# Patient Record
Sex: MALE | Race: WHITE | NOT HISPANIC OR LATINO | URBAN - METROPOLITAN AREA
[De-identification: names, ages, dates, MRNs, and addresses within clinical notes are randomized per-mention and may not be internally consistent; named-entity substitution may affect disease eponyms.]

---

## 2021-02-23 ENCOUNTER — HISTORICAL (OUTPATIENT)
Dept: ADMINISTRATIVE | Facility: HOSPITAL | Age: 71
End: 2021-02-23

## 2022-04-09 ENCOUNTER — HISTORICAL (OUTPATIENT)
Dept: ADMINISTRATIVE | Facility: HOSPITAL | Age: 72
End: 2022-04-09
Payer: MEDICARE

## 2022-04-29 VITALS
BODY MASS INDEX: 33.64 KG/M2 | SYSTOLIC BLOOD PRESSURE: 140 MMHG | DIASTOLIC BLOOD PRESSURE: 72 MMHG | DIASTOLIC BLOOD PRESSURE: 79 MMHG | SYSTOLIC BLOOD PRESSURE: 157 MMHG | HEIGHT: 70 IN | WEIGHT: 235 LBS | HEIGHT: 70 IN | WEIGHT: 225.38 LBS | BODY MASS INDEX: 32.27 KG/M2

## 2022-05-02 NOTE — HISTORICAL [facility]
This is a historical note converted from [employer]. Formatting and pictures may have been removed.  Please reference [employer] for original formatting and attached multimedia. Chief Complaint  RIGHT KNEE PAIN. ?NO ?INJURY. ?HIS LEG SWELLS UP FROM THE KNEE TO HIS FOOT.  History of Present Illness  70-year-old male presents today for evaluation of bilateral knee pain right worse than left. ?He has knee pain for a number of years. ?He tried anti-inflammatories for this.? No injections.? Notes pain with ambulation?to the medial aspect of his right knee as well as patellofemoral joint. ?Has pain with deep knee flexion as well.? He has pain on a daily basis?that limits his activities of daily living.? Pain is sharp in nature. ?Denies radiation proximally or distally.  Review of Systems  Denies fevers, chills, chest pain, shortness of breath. Comprehensive review of systems performed and otherwise negative except as noted in HPI.  Physical Exam  Vitals & Measurements  T:?97.2? ?F (Oral)? BP:?140/72?  HT:?178.00?cm? WT:?106.590?kg? BMI:?33.64?  General: No acute distress, alert and oriented, healthy appearing?  HEENT: Head is atraumatic, mucous membranes are moist?  Cardiovascular: Brisk capillary refill  Lungs: Breathing non-labored?  Skin: no rashes appreciated?  Neurologic: Sensation is grossly intact distally  ?  Right knee:  Patient with extension to 5. ?Flexion to 95. ?Limited further due to pain. ?Has crepitus patellofemoral joint medial joint line.? Overall varus alignment that is not fully correctable.  Assessment/Plan  1.?Primary osteoarthritis of right knee?M17.11  ?Patient end-stage arthritis the right knee. ?He has tried anti-inflammatories. ?We discussed further treatment options including injections as well as eventual arthroplasty.? The current time he like to continue with conservative measures and will try an injection.? Plan to see him back in?2 to 3 months for repeat evaluation.  ?  After verbal consent was  obtained the patients right knee was prepped with Betadine and anesthetized with ethyl chloride. The right knee joint was then injected under sterile technique with 2 mL of 2% lidocaine and betamethasone it was dressed with a Band-Aid. The patient received good relief from the injection and was able to ambulate normally from clinic.  Orders:  XR Knee Right 4 or More Views, Routine, 02/23/21 8:18:00 CST, None, Ambulatory, Rad Type, Right knee pain, Not Scheduled, 02/23/21 8:18:00 CST   Problem List/Past Medical History  Ongoing  Obesity  Historical  Diabetes mellitus  Hyperlipidemia  Hypertension  Procedure/Surgical History  Appendectomy  BACK  Stent placement   Medications  amLODIPine 10 mg oral tablet, 10 mg= 1 tab(s), Oral, Daily  atorvastatin 40 mg oral tablet, 40 mg= 1 tab(s), Oral, Daily  carvedilol 6.25 mg oral tablet, 6.25 mg= 1 tab(s), Oral, BID  glimepiride 4 mg oral tablet, 4 mg= 1 tab(s), Oral, Daily  hydrochlorothiazide-valsartan 25 mg-320 mg oral tablet, 1 tab(s), Oral, Daily  metformin 500 mg oral tablet, 500 mg= 1 tab(s), Oral, BID  Allergies  No Known Medication Allergies  Social History  Abuse/Neglect  No, No, Yes, 02/23/2021  Alcohol  Past, 02/23/2021  Tobacco  Former smoker, quit more than 30 days ago, No, 02/23/2021  Health Maintenance  Health Maintenance  ???Pending?(in the next year)  ??? ??OverDue  ??? ? ? ?Advance Directive due??01/02/21??and every 1??year(s)  ??? ? ? ?Cognitive Screening due??01/02/21??and every 1??year(s)  ??? ? ? ?Functional Assessment due??01/02/21??and every 1??year(s)  ??? ??Due?  ??? ? ? ?Alcohol Misuse Screening due??01/02/21??and every 1??year(s)  ??? ? ? ?ADL Screening due??02/23/21??and every 1??year(s)  ??? ? ? ?Aspirin Therapy for CVD Prevention due??02/23/21??and every 1??year(s)  ??? ? ? ?Medicare Annual Wellness Exam due??02/23/21??and every 1??year(s)  ??? ? ? ?Tetanus Vaccine due??02/23/21??and every 10??year(s)  ??? ??Due In Future?  ??? ? ? ?Obesity  Screening not due until??01/01/22??and every 1??year(s)  ??? ? ? ?Fall Risk Assessment not due until??01/02/22??and every 1??year(s)  ???Satisfied?(in the past 1 year)  ??? ??Satisfied?  ??? ? ? ?Blood Pressure Screening on??02/23/21.??Satisfied by Erin Roe  ??? ? ? ?Body Mass Index Check on??02/23/21.??Satisfied by Erin Roe  ??? ? ? ?Fall Risk Assessment on??02/23/21.??Satisfied by Erin Roe  ??? ? ? ?Obesity Screening on??02/23/21.??Satisfied by Erin Roe  ?  Diagnostic Results  AP lateral right knee as well as left knee reviewed. ?Patient with end-stage arthritis to the medial joint line patellofemoral joint.? Kellgren-Nawaf?grade 3 changes

## 2022-05-03 ENCOUNTER — HOSPITAL ENCOUNTER (OUTPATIENT)
Dept: RADIOLOGY | Facility: CLINIC | Age: 72
Discharge: HOME | End: 2022-05-03
Attending: ORTHOPAEDIC SURGERY
Payer: MEDICARE

## 2022-05-03 ENCOUNTER — OFFICE VISIT (OUTPATIENT)
Dept: ORTHOPEDICS | Facility: CLINIC | Age: 72
End: 2022-05-03

## 2022-05-03 VITALS
TEMPERATURE: 98 F | SYSTOLIC BLOOD PRESSURE: 157 MMHG | HEIGHT: 70 IN | HEART RATE: 64 BPM | DIASTOLIC BLOOD PRESSURE: 68 MMHG | BODY MASS INDEX: 31.92 KG/M2 | WEIGHT: 223 LBS

## 2022-05-03 DIAGNOSIS — M17.11 OSTEOARTHRITIS OF RIGHT KNEE, UNSPECIFIED OSTEOARTHRITIS TYPE: Primary | ICD-10-CM

## 2022-05-03 DIAGNOSIS — M17.12 PRIMARY OSTEOARTHRITIS OF LEFT KNEE: ICD-10-CM

## 2022-05-03 PROCEDURE — 99213 OFFICE O/P EST LOW 20 MIN: CPT | Mod: 25,,, | Performed by: ORTHOPAEDIC SURGERY

## 2022-05-03 PROCEDURE — 1101F PT FALLS ASSESS-DOCD LE1/YR: CPT | Mod: CPTII,,, | Performed by: ORTHOPAEDIC SURGERY

## 2022-05-03 PROCEDURE — 4010F PR ACE/ARB THEARPY RXD/TAKEN: ICD-10-PCS | Mod: CPTII,,, | Performed by: ORTHOPAEDIC SURGERY

## 2022-05-03 PROCEDURE — 3288F PR FALLS RISK ASSESSMENT DOCUMENTED: ICD-10-PCS | Mod: CPTII,,, | Performed by: ORTHOPAEDIC SURGERY

## 2022-05-03 PROCEDURE — 20610 LARGE JOINT ASPIRATION/INJECTION: BILATERAL KNEE: ICD-10-PCS | Mod: 50,,, | Performed by: ORTHOPAEDIC SURGERY

## 2022-05-03 PROCEDURE — 3078F DIAST BP <80 MM HG: CPT | Mod: CPTII,,, | Performed by: ORTHOPAEDIC SURGERY

## 2022-05-03 PROCEDURE — 99213 PR OFFICE/OUTPT VISIT, EST, LEVL III, 20-29 MIN: ICD-10-PCS | Mod: 25,,, | Performed by: ORTHOPAEDIC SURGERY

## 2022-05-03 PROCEDURE — 1125F AMNT PAIN NOTED PAIN PRSNT: CPT | Mod: CPTII,,, | Performed by: ORTHOPAEDIC SURGERY

## 2022-05-03 PROCEDURE — 73564 PR  X-RAY KNEE 4+ VIEW: ICD-10-PCS | Mod: RT,,, | Performed by: ORTHOPAEDIC SURGERY

## 2022-05-03 PROCEDURE — 3288F FALL RISK ASSESSMENT DOCD: CPT | Mod: CPTII,,, | Performed by: ORTHOPAEDIC SURGERY

## 2022-05-03 PROCEDURE — 3077F SYST BP >= 140 MM HG: CPT | Mod: CPTII,,, | Performed by: ORTHOPAEDIC SURGERY

## 2022-05-03 PROCEDURE — 1159F MED LIST DOCD IN RCRD: CPT | Mod: CPTII,,, | Performed by: ORTHOPAEDIC SURGERY

## 2022-05-03 PROCEDURE — 4010F ACE/ARB THERAPY RXD/TAKEN: CPT | Mod: CPTII,,, | Performed by: ORTHOPAEDIC SURGERY

## 2022-05-03 PROCEDURE — 20610 DRAIN/INJ JOINT/BURSA W/O US: CPT | Mod: 50,,, | Performed by: ORTHOPAEDIC SURGERY

## 2022-05-03 PROCEDURE — 3008F BODY MASS INDEX DOCD: CPT | Mod: CPTII,,, | Performed by: ORTHOPAEDIC SURGERY

## 2022-05-03 PROCEDURE — 3078F PR MOST RECENT DIASTOLIC BLOOD PRESSURE < 80 MM HG: ICD-10-PCS | Mod: CPTII,,, | Performed by: ORTHOPAEDIC SURGERY

## 2022-05-03 PROCEDURE — 3077F PR MOST RECENT SYSTOLIC BLOOD PRESSURE >= 140 MM HG: ICD-10-PCS | Mod: CPTII,,, | Performed by: ORTHOPAEDIC SURGERY

## 2022-05-03 PROCEDURE — 73564 X-RAY EXAM KNEE 4 OR MORE: CPT | Mod: RT,,, | Performed by: ORTHOPAEDIC SURGERY

## 2022-05-03 PROCEDURE — 1101F PR PT FALLS ASSESS DOC 0-1 FALLS W/OUT INJ PAST YR: ICD-10-PCS | Mod: CPTII,,, | Performed by: ORTHOPAEDIC SURGERY

## 2022-05-03 PROCEDURE — 1159F PR MEDICATION LIST DOCUMENTED IN MEDICAL RECORD: ICD-10-PCS | Mod: CPTII,,, | Performed by: ORTHOPAEDIC SURGERY

## 2022-05-03 PROCEDURE — 3008F PR BODY MASS INDEX (BMI) DOCUMENTED: ICD-10-PCS | Mod: CPTII,,, | Performed by: ORTHOPAEDIC SURGERY

## 2022-05-03 PROCEDURE — 1125F PR PAIN SEVERITY QUANTIFIED, PAIN PRESENT: ICD-10-PCS | Mod: CPTII,,, | Performed by: ORTHOPAEDIC SURGERY

## 2022-05-03 RX ORDER — BETAMETHASONE SODIUM PHOSPHATE AND BETAMETHASONE ACETATE 3; 3 MG/ML; MG/ML
12 INJECTION, SUSPENSION INTRA-ARTICULAR; INTRALESIONAL; INTRAMUSCULAR; SOFT TISSUE
Status: DISCONTINUED | OUTPATIENT
Start: 2022-05-03 | End: 2022-05-03 | Stop reason: HOSPADM

## 2022-05-03 RX ORDER — AMLODIPINE BESYLATE 10 MG/1
10 TABLET ORAL DAILY
COMMUNITY
Start: 2022-04-17

## 2022-05-03 RX ORDER — CARVEDILOL 6.25 MG/1
6.25 TABLET ORAL 2 TIMES DAILY
COMMUNITY
Start: 2022-04-02

## 2022-05-03 RX ORDER — NAPROXEN SODIUM 220 MG
TABLET ORAL
Status: ON HOLD | COMMUNITY
Start: 2021-05-25 | End: 2022-08-09 | Stop reason: HOSPADM

## 2022-05-03 RX ORDER — METFORMIN HYDROCHLORIDE 500 MG/1
500 TABLET ORAL 2 TIMES DAILY WITH MEALS
Status: ON HOLD | COMMUNITY
Start: 2022-03-01 | End: 2022-08-09 | Stop reason: SDUPTHER

## 2022-05-03 RX ORDER — VALSARTAN 160 MG/1
160 TABLET ORAL DAILY
COMMUNITY
Start: 2022-03-01

## 2022-05-03 RX ORDER — GLIMEPIRIDE 4 MG/1
4 TABLET ORAL 2 TIMES DAILY
COMMUNITY
Start: 2022-03-01

## 2022-05-03 RX ORDER — LIDOCAINE HYDROCHLORIDE 20 MG/ML
5 INJECTION, SOLUTION EPIDURAL; INFILTRATION; INTRACAUDAL; PERINEURAL
Status: DISCONTINUED | OUTPATIENT
Start: 2022-05-03 | End: 2022-05-03 | Stop reason: HOSPADM

## 2022-05-03 RX ORDER — ATORVASTATIN CALCIUM 40 MG/1
TABLET, FILM COATED ORAL
COMMUNITY
Start: 2022-04-17

## 2022-05-03 RX ADMIN — BETAMETHASONE SODIUM PHOSPHATE AND BETAMETHASONE ACETATE 12 MG: 3; 3 INJECTION, SUSPENSION INTRA-ARTICULAR; INTRALESIONAL; INTRAMUSCULAR; SOFT TISSUE at 03:05

## 2022-05-03 RX ADMIN — LIDOCAINE HYDROCHLORIDE 5 ML: 20 INJECTION, SOLUTION EPIDURAL; INFILTRATION; INTRACAUDAL; PERINEURAL at 03:05

## 2022-05-03 NOTE — PROGRESS NOTES
.  Past Medical History:   Diagnosis Date    Diabetes mellitus, type 2     High cholesterol     Hypertension     Osteoarthritis        Past Surgical History:   Procedure Laterality Date    appendix removal      BACK SURGERY         Current Outpatient Medications   Medication Sig    naproxen sodium (ANAPROX) 220 MG tablet Take by mouth.    amLODIPine (NORVASC) 10 MG tablet Take 10 mg by mouth once daily.    atorvastatin (LIPITOR) 40 MG tablet TAKE 1 TABLET BY MOUTH 1 TIME IN THE EVENING    carvediloL (COREG) 6.25 MG tablet Take 6.25 mg by mouth 2 (two) times daily.    glimepiride (AMARYL) 4 MG tablet Take 4 mg by mouth 2 (two) times daily.    metFORMIN (GLUCOPHAGE) 500 MG tablet Take 500 mg by mouth 2 (two) times daily with meals.    valsartan (DIOVAN) 160 MG tablet Take 160 mg by mouth once daily.     No current facility-administered medications for this visit.       Review of patient's allergies indicates:  No Known Allergies    Family History   Family history unknown: Yes       Social History     Socioeconomic History    Marital status:    Tobacco Use    Smoking status: Never Smoker    Smokeless tobacco: Never Used   Substance and Sexual Activity    Alcohol use: Not Currently    Drug use: Never       Chief Complaint:   Chief Complaint   Patient presents with    Right Knee - Pain    Left Knee - Pain       History of present illness:    72-year-old male presents today for follow-up of right knee osteoarthritis.  Patient is to have significant severe pain in the right knee.  Got injections in about 5 months ago with roughly 1-2 months relief.  Requesting repeat injections today in both knees as his left knee is also causing him significant discomfort    Review of Systems:    Constitution: Negative for chills, fever, and sweats.  Negative for unexplained weight loss.    HENT:  Negative for headaches and blurry vision.    Cardiovascular:Negative for chest pain or irregular heart beat.  Ok, let's wait for hep C result to make sure it is done first, and then will re-order LFT's Negative for hypertension.    Respiratory:  Negative for cough and shortness of breath.    Gastrointestinal: Negative for abdominal pain, heartburn, melena, nausea, and vomitting.    Genitourinary:  Negative bladder incontinence and dysuria.    Musculoskeletal:  See HPI    Neurological: Negative for numbness.    Psychiatric/Behavioral: Negative for depression.  The patient is not nervous/anxious.      Endocrine: Negative for polyuria    Hematologic/Lymphatic: Negative for bleeding problem.  Does not bruise/bleed easily.    Skin: Negative for poor would healing and rash      Physical Examination:    Vital Signs:    Vitals:    05/03/22 1441   BP: (!) 157/68   Pulse: 64   Temp: 97.6 °F (36.4 °C)       Body mass index is 32 kg/m².    .General: No acute distress, alert and oriented, healthy appearing    HEENT: Head is atraumatic, mucous membranes are moist    Neck: Supples, no JVD    Cardiovascular: Palpable dorsalis pedis and posterior tibial pulses, regular rate and rhythm to those pulses    Lungs: Breathing non-labored    Skin: no rashes appreciated    Neurologic: Can flex and extend knees, ankles, and toes. Sensation is grossly intact    Bilateral knees:  Sensation intact distally.  Brisk cap refill distally.  Range of motion of both knees is from 0-110.  He has a varus alignment that is correctable.  Crepitus throughout motion.    X-Rays:  The AP lateral bilateral knees reviewed.  Patient has end-stage arthritis with loss of joint space in the medial joint line     Assessment::.  Bilateral knee osteoarthritis    Plan:  Patient intubated arthritis to both knees.  Discussed all treatment options today.  We gave him injections to both knees and see him back in 3 months repeat injection.    .After verbal consent was obtained the patients left knee was sterily prepped. The left knee joint was then injected under sterile technique with 2 mL of 2% lidocaine and 12 mg betamethasone. It was dressed with a Band-Aid. The  patient received good relief from the injection and was able to ambulate normally from clinic.    After verbal consent was obtained the patients right knee was sterilely prepped.  The right knee joint was then injected under sterile technique with 2 mL of 2% lidocaine and 12 mg betamethasone it was dressed with a Band-Aid. The patient received good relief from the injection and was able to ambulate normally from clinic.      This note was created using [employer] voice recognition software that occasionally misinterpreted phrases or words.    Consult note is delivered via Epic messaging service.

## 2022-05-03 NOTE — PROCEDURES
Large Joint Aspiration/Injection: bilateral knee    Date/Time: 5/3/2022 3:00 PM  Performed by: Derek Whalen MD  Authorized by: Derek Whalen MD     Consent Done?:  Yes (Verbal)  Indications:  Arthritis, joint swelling and pain  Timeout: prior to procedure the correct patient, procedure, and site was verified    Prep: patient was prepped and draped in usual sterile fashion      Details:  Needle Size:  22 G  Approach:  Anterolateral  Location:  Knee  Laterality:  Bilateral  Site:  Bilateral knee  Medications (Right):  5 mL LIDOcaine (PF) 20 mg/mL (2%) 20 mg/mL (2 %); 12 mg betamethasone acetate-betamethasone sodium phosphate 6 mg/mL  Medications (Left):  5 mL LIDOcaine (PF) 20 mg/mL (2%) 20 mg/mL (2 %); 12 mg betamethasone acetate-betamethasone sodium phosphate 6 mg/mL  Patient tolerance:  Patient tolerated the procedure well with no immediate complications

## 2022-06-21 ENCOUNTER — OFFICE VISIT (OUTPATIENT)
Dept: ORTHOPEDICS | Facility: CLINIC | Age: 72
End: 2022-06-21
Payer: MEDICARE

## 2022-06-21 VITALS
BODY MASS INDEX: 31.92 KG/M2 | DIASTOLIC BLOOD PRESSURE: 69 MMHG | WEIGHT: 223 LBS | HEIGHT: 70 IN | HEART RATE: 61 BPM | SYSTOLIC BLOOD PRESSURE: 138 MMHG

## 2022-06-21 DIAGNOSIS — M17.11 PRIMARY OSTEOARTHRITIS OF RIGHT KNEE: Primary | ICD-10-CM

## 2022-06-21 PROCEDURE — 1101F PT FALLS ASSESS-DOCD LE1/YR: CPT | Mod: CPTII,,, | Performed by: ORTHOPAEDIC SURGERY

## 2022-06-21 PROCEDURE — 99214 OFFICE O/P EST MOD 30 MIN: CPT | Mod: ,,, | Performed by: ORTHOPAEDIC SURGERY

## 2022-06-21 PROCEDURE — 1159F MED LIST DOCD IN RCRD: CPT | Mod: CPTII,,, | Performed by: ORTHOPAEDIC SURGERY

## 2022-06-21 PROCEDURE — 4010F PR ACE/ARB THEARPY RXD/TAKEN: ICD-10-PCS | Mod: CPTII,,, | Performed by: ORTHOPAEDIC SURGERY

## 2022-06-21 PROCEDURE — 1159F PR MEDICATION LIST DOCUMENTED IN MEDICAL RECORD: ICD-10-PCS | Mod: CPTII,,, | Performed by: ORTHOPAEDIC SURGERY

## 2022-06-21 PROCEDURE — 1101F PR PT FALLS ASSESS DOC 0-1 FALLS W/OUT INJ PAST YR: ICD-10-PCS | Mod: CPTII,,, | Performed by: ORTHOPAEDIC SURGERY

## 2022-06-21 PROCEDURE — 3078F PR MOST RECENT DIASTOLIC BLOOD PRESSURE < 80 MM HG: ICD-10-PCS | Mod: CPTII,,, | Performed by: ORTHOPAEDIC SURGERY

## 2022-06-21 PROCEDURE — 3078F DIAST BP <80 MM HG: CPT | Mod: CPTII,,, | Performed by: ORTHOPAEDIC SURGERY

## 2022-06-21 PROCEDURE — 3288F FALL RISK ASSESSMENT DOCD: CPT | Mod: CPTII,,, | Performed by: ORTHOPAEDIC SURGERY

## 2022-06-21 PROCEDURE — 99214 PR OFFICE/OUTPT VISIT, EST, LEVL IV, 30-39 MIN: ICD-10-PCS | Mod: ,,, | Performed by: ORTHOPAEDIC SURGERY

## 2022-06-21 PROCEDURE — 4010F ACE/ARB THERAPY RXD/TAKEN: CPT | Mod: CPTII,,, | Performed by: ORTHOPAEDIC SURGERY

## 2022-06-21 PROCEDURE — 3075F SYST BP GE 130 - 139MM HG: CPT | Mod: CPTII,,, | Performed by: ORTHOPAEDIC SURGERY

## 2022-06-21 PROCEDURE — 3288F PR FALLS RISK ASSESSMENT DOCUMENTED: ICD-10-PCS | Mod: CPTII,,, | Performed by: ORTHOPAEDIC SURGERY

## 2022-06-21 PROCEDURE — 3008F BODY MASS INDEX DOCD: CPT | Mod: CPTII,,, | Performed by: ORTHOPAEDIC SURGERY

## 2022-06-21 PROCEDURE — 3008F PR BODY MASS INDEX (BMI) DOCUMENTED: ICD-10-PCS | Mod: CPTII,,, | Performed by: ORTHOPAEDIC SURGERY

## 2022-06-21 PROCEDURE — 3075F PR MOST RECENT SYSTOLIC BLOOD PRESS GE 130-139MM HG: ICD-10-PCS | Mod: CPTII,,, | Performed by: ORTHOPAEDIC SURGERY

## 2022-06-21 NOTE — PROGRESS NOTES
Past Medical History:   Diagnosis Date    Diabetes mellitus, type 2     High cholesterol     Hypertension     Osteoarthritis        Past Surgical History:   Procedure Laterality Date    appendix removal      BACK SURGERY         Current Outpatient Medications   Medication Sig    amLODIPine (NORVASC) 10 MG tablet Take 10 mg by mouth once daily.    atorvastatin (LIPITOR) 40 MG tablet TAKE 1 TABLET BY MOUTH 1 TIME IN THE EVENING    carvediloL (COREG) 6.25 MG tablet Take 6.25 mg by mouth 2 (two) times daily.    glimepiride (AMARYL) 4 MG tablet Take 4 mg by mouth 2 (two) times daily.    metFORMIN (GLUCOPHAGE) 500 MG tablet Take 500 mg by mouth 2 (two) times daily with meals.    naproxen sodium (ANAPROX) 220 MG tablet Take by mouth.    valsartan (DIOVAN) 160 MG tablet Take 160 mg by mouth once daily.     No current facility-administered medications for this visit.       Review of patient's allergies indicates:  No Known Allergies    Family History   Family history unknown: Yes       Social History     Socioeconomic History    Marital status:    Tobacco Use    Smoking status: Never Smoker    Smokeless tobacco: Never Used   Substance and Sexual Activity    Alcohol use: Not Currently    Drug use: Never       Chief Complaint:   Chief Complaint   Patient presents with    Pain     right knee, wants to talk about sx, pt states inj are not helping with pain said it would last about a month to couple of weeks, wants to discuss sx to get right knee replaced,        History of present illness:  72-year-old male presents today for follow-up after injections into both knees.  Left knee injection helped him significantly.  Right knee helped minimally.  He complains of significant right knee pain.  It is worse with activity.  Improved by rest.  Patient feels as though he has reached a point disability and like to proceed with surgical intervention with regard to the right knee.      Review of  Systems:    Constitution: Negative for chills, fever, and sweats.  Negative for unexplained weight loss.    HENT:  Negative for headaches and blurry vision.    Cardiovascular:Negative for chest pain or irregular heart beat. Negative for hypertension.    Respiratory:  Negative for cough and shortness of breath.    Gastrointestinal: Negative for abdominal pain, heartburn, melena, nausea, and vomitting.    Genitourinary:  Negative bladder incontinence and dysuria.    Musculoskeletal:  See HPI    Neurological: Negative for numbness.    Psychiatric/Behavioral: Negative for depression.  The patient is not nervous/anxious.      Endocrine: Negative for polyuria    Hematologic/Lymphatic: Negative for bleeding problem.  Does not bruise/bleed easily.    Skin: Negative for poor would healing and rash      Physical Examination:    Vital Signs:    Vitals:    06/21/22 1423   BP: 138/69   Pulse: 61       Body mass index is 32 kg/m².    General: No acute distress, alert and oriented, healthy appearing    HEENT: Head is atraumatic, mucous membranes are moist    Neck: Supples, no JVD    Cardiovascular: Palpable dorsalis pedis and posterior tibial pulses, regular rate and rhythm to those pulses    Lungs: Breathing non-labored    Skin: no rashes appreciated    Neurologic: Can flex and extend knees, ankles, and toes. Sensation is grossly intact    Right knee:  Sensation intact distally.  Brisk cap refill distally.  Patient with varus alignment that is not correctable.  He has significant crepitus throughout range of motion.  Extension of 5.  Flexion of 105.     X-rays:  X-rays reviewed.  Patient end-stage arthritis of the right knee.   bone-on-bone articulation.     Assessment::  Right knee osteoarthritis    Plan:  At this point the patient is tried and failed all conservative management with regards to their knee. They have tried and failed nonoperative management including: Anti-inflammatories, activity modification, injections. All of  these have failed to completely remove their pain. They have pain going up and down stairs as well as walking on level ground. The knee pain is affecting activities of daily living They feel that theyve reached a point of disability with regards to their knee. X-rays reveal advanced, end-stage degenerative osteoarthritis as noted by subchondral sclerosis, joint space narrowing in the medial, lateral, patellofemoral compartment, and periarticular osteophytes. The patient would like to proceed with surgical intervention and would be a good candidate for total knee replacement.    Total knee arthroplasty procedure, alternatives, risks, and benefits were discussed in detail. The risks including but not limited to: infection, need for revision surgery, pain, swelling, loosening, injury to surrounding neurovascular structures, stiffness, incomplete resolution of pain, DVT, PE, and death were discussed in detail. Despite these risks, the patient would like to proceed with surgical intervention. All questions were answered, no guarantees made. Will plan for right TKA on 8/8/22.      This note was created using [employer] voice recognition software that occasionally misinterpreted phrases or words.    Consult note is delivered via Epic messaging service.

## 2022-07-21 ENCOUNTER — OFFICE VISIT (OUTPATIENT)
Dept: ORTHOPEDICS | Facility: CLINIC | Age: 72
End: 2022-07-21
Payer: MEDICARE

## 2022-07-21 ENCOUNTER — HOSPITAL ENCOUNTER (OUTPATIENT)
Dept: RADIOLOGY | Facility: HOSPITAL | Age: 72
Discharge: HOME | End: 2022-07-21
Attending: NURSE PRACTITIONER
Payer: MEDICARE

## 2022-07-21 VITALS
HEIGHT: 70 IN | BODY MASS INDEX: 31.92 KG/M2 | HEART RATE: 62 BPM | WEIGHT: 223 LBS | DIASTOLIC BLOOD PRESSURE: 69 MMHG | SYSTOLIC BLOOD PRESSURE: 151 MMHG

## 2022-07-21 DIAGNOSIS — Z01.818 PREOPERATIVE TESTING: ICD-10-CM

## 2022-07-21 DIAGNOSIS — E11.9 TYPE 2 DIABETES MELLITUS WITHOUT COMPLICATION, WITHOUT LONG-TERM CURRENT USE OF INSULIN: ICD-10-CM

## 2022-07-21 DIAGNOSIS — M17.11 PRIMARY OSTEOARTHRITIS OF RIGHT KNEE: Primary | ICD-10-CM

## 2022-07-21 DIAGNOSIS — M17.11 PRIMARY OSTEOARTHRITIS OF RIGHT KNEE: ICD-10-CM

## 2022-07-21 LAB
APPEARANCE UR: CLEAR
BACTERIA #/AREA URNS AUTO: ABNORMAL /HPF
BILIRUB UR QL STRIP.AUTO: NEGATIVE MG/DL
COLOR UR AUTO: YELLOW
EST. AVERAGE GLUCOSE BLD GHB EST-MCNC: 168.6 MG/DL
GLUCOSE UR QL STRIP.AUTO: 250 MG/DL
HBA1C MFR BLD: 7.5 %
KETONES UR QL STRIP.AUTO: NEGATIVE MG/DL
LEUKOCYTE ESTERASE UR QL STRIP.AUTO: ABNORMAL UNIT/L
MRSA PCR SCRN (OHS): NOT DETECTED
NITRITE UR QL STRIP.AUTO: NEGATIVE
PH UR STRIP.AUTO: 5.5 [PH]
PROT UR QL STRIP.AUTO: NEGATIVE MG/DL
RBC #/AREA URNS AUTO: ABNORMAL /HPF
RBC UR QL AUTO: NEGATIVE UNIT/L
SP GR UR STRIP.AUTO: 1.01
SQUAMOUS #/AREA URNS AUTO: ABNORMAL /HPF
UROBILINOGEN UR STRIP-ACNC: 0.2 MG/DL
WBC #/AREA URNS AUTO: ABNORMAL /HPF

## 2022-07-21 PROCEDURE — 1160F RVW MEDS BY RX/DR IN RCRD: CPT | Mod: CPTII,,, | Performed by: NURSE PRACTITIONER

## 2022-07-21 PROCEDURE — 1159F PR MEDICATION LIST DOCUMENTED IN MEDICAL RECORD: ICD-10-PCS | Mod: CPTII,,, | Performed by: NURSE PRACTITIONER

## 2022-07-21 PROCEDURE — 3008F PR BODY MASS INDEX (BMI) DOCUMENTED: ICD-10-PCS | Mod: CPTII,,, | Performed by: NURSE PRACTITIONER

## 2022-07-21 PROCEDURE — 1159F MED LIST DOCD IN RCRD: CPT | Mod: CPTII,,, | Performed by: NURSE PRACTITIONER

## 2022-07-21 PROCEDURE — 3078F DIAST BP <80 MM HG: CPT | Mod: CPTII,,, | Performed by: NURSE PRACTITIONER

## 2022-07-21 PROCEDURE — 4010F PR ACE/ARB THEARPY RXD/TAKEN: ICD-10-PCS | Mod: CPTII,,, | Performed by: NURSE PRACTITIONER

## 2022-07-21 PROCEDURE — 3077F PR MOST RECENT SYSTOLIC BLOOD PRESSURE >= 140 MM HG: ICD-10-PCS | Mod: CPTII,,, | Performed by: NURSE PRACTITIONER

## 2022-07-21 PROCEDURE — 71046 X-RAY EXAM CHEST 2 VIEWS: CPT | Mod: TC

## 2022-07-21 PROCEDURE — 1160F PR REVIEW ALL MEDS BY PRESCRIBER/CLIN PHARMACIST DOCUMENTED: ICD-10-PCS | Mod: CPTII,,, | Performed by: NURSE PRACTITIONER

## 2022-07-21 PROCEDURE — 99213 PR OFFICE/OUTPT VISIT, EST, LEVL III, 20-29 MIN: ICD-10-PCS | Mod: ,,, | Performed by: NURSE PRACTITIONER

## 2022-07-21 PROCEDURE — 3077F SYST BP >= 140 MM HG: CPT | Mod: CPTII,,, | Performed by: NURSE PRACTITIONER

## 2022-07-21 PROCEDURE — 4010F ACE/ARB THERAPY RXD/TAKEN: CPT | Mod: CPTII,,, | Performed by: NURSE PRACTITIONER

## 2022-07-21 PROCEDURE — 73700 CT LOWER EXTREMITY W/O DYE: CPT | Mod: TC,RT

## 2022-07-21 PROCEDURE — 3078F PR MOST RECENT DIASTOLIC BLOOD PRESSURE < 80 MM HG: ICD-10-PCS | Mod: CPTII,,, | Performed by: NURSE PRACTITIONER

## 2022-07-21 PROCEDURE — 99213 OFFICE O/P EST LOW 20 MIN: CPT | Mod: ,,, | Performed by: NURSE PRACTITIONER

## 2022-07-21 PROCEDURE — 3008F BODY MASS INDEX DOCD: CPT | Mod: CPTII,,, | Performed by: NURSE PRACTITIONER

## 2022-07-21 PROCEDURE — 36415 COLL VENOUS BLD VENIPUNCTURE: CPT | Performed by: NURSE PRACTITIONER

## 2022-07-21 RX ORDER — ACETAMINOPHEN 500 MG
1000 TABLET ORAL
Status: CANCELLED | OUTPATIENT
Start: 2022-07-21 | End: 2022-07-21

## 2022-07-21 RX ORDER — KETOROLAC TROMETHAMINE 30 MG/ML
15 INJECTION, SOLUTION INTRAMUSCULAR; INTRAVENOUS
Status: CANCELLED | OUTPATIENT
Start: 2022-07-21 | End: 2022-07-21

## 2022-07-21 RX ORDER — GABAPENTIN 100 MG/1
600 CAPSULE ORAL
Status: CANCELLED | OUTPATIENT
Start: 2022-07-21

## 2022-07-21 RX ORDER — TRANEXAMIC ACID 650 MG/1
1950 TABLET ORAL
Status: CANCELLED | OUTPATIENT
Start: 2022-07-21 | End: 2022-07-21

## 2022-07-21 RX ORDER — SODIUM CHLORIDE 9 MG/ML
INJECTION, SOLUTION INTRAVENOUS CONTINUOUS
Status: CANCELLED | OUTPATIENT
Start: 2022-07-21

## 2022-07-21 RX ORDER — SCOLOPAMINE TRANSDERMAL SYSTEM 1 MG/1
1 PATCH, EXTENDED RELEASE TRANSDERMAL
Status: CANCELLED | OUTPATIENT
Start: 2022-07-21

## 2022-07-21 NOTE — H&P (VIEW-ONLY)
Past Medical History:   Diagnosis Date    Diabetes mellitus, type 2     High cholesterol     Hypertension     Osteoarthritis        Past Surgical History:   Procedure Laterality Date    appendix removal      BACK SURGERY         Current Outpatient Medications   Medication Sig    amLODIPine (NORVASC) 10 MG tablet Take 10 mg by mouth once daily.    atorvastatin (LIPITOR) 40 MG tablet TAKE 1 TABLET BY MOUTH 1 TIME IN THE EVENING    carvediloL (COREG) 6.25 MG tablet Take 6.25 mg by mouth 2 (two) times daily.    glimepiride (AMARYL) 4 MG tablet Take 4 mg by mouth 2 (two) times daily.    metFORMIN (GLUCOPHAGE) 500 MG tablet Take 500 mg by mouth 2 (two) times daily with meals.    naproxen sodium (ANAPROX) 220 MG tablet Take by mouth.    valsartan (DIOVAN) 160 MG tablet Take 160 mg by mouth once daily.     No current facility-administered medications for this visit.       Review of patient's allergies indicates:  No Known Allergies    Family History   Family history unknown: Yes       Social History     Socioeconomic History    Marital status:    Tobacco Use    Smoking status: Never Smoker    Smokeless tobacco: Never Used   Substance and Sexual Activity    Alcohol use: Not Currently    Drug use: Never       Chief Complaint:   Chief Complaint   Patient presents with    Pre-op Exam     Pre op for right TKA [facility]OL on 8/8/22       History of present illness: Moises San is a 72 y.o. male, presents to clinic today in regards to right knee pain. This pain is chronic in nature and worsened overtime. Pain is increased with bending, squatting, and prolonged ambulation. Located throughout the knee. He has tried OTC anti-inflammatories, multiple injections, and lifestyle modifications with little to no relief. This pain has affected his daily living activities. He feels as though he has reached a point of disability and would like to proceed with a right total knee arthroplasty.      Review of  Systems:    Denies fevers, chills, chest pain, shortness of breath. Comprehensive review of systems performed and otherwise negative except as noted in HPI     Physical Examination:    General: awake and alert, no acute distress, healthy appearing  Head and Neck: Head atraumatic/normocephalic. Moist MM  CV: brisk cap refill  Lungs: non-labored breathing, w/o cough or SOB  Skin: no rashes present, warm to touch  Neuro: sensation grossly intact distally       Vital Signs:  There were no vitals filed for this visit.    There is no height or weight on file to calculate BMI.    Right knee:  Sensation intact distally.    Brisk cap refill distally.    Patient with varus alignment that is not correctable.    He has significant crepitus throughout range of motion.  Extension of 5.  Flexion of 105.   Skin warm, dry and intact.       Assessment::Primary OA R knee    Plan:  At this point the patient is tried and failed all conservative management with regards to their right knee. They have tried and failed nonoperative management including: Anti-inflammatories, activity modification, injections. All of these have failed to completely remove their pain. They have pain going up and down stairs as well as walking on level ground. The knee pain is affecting activities of daily living They feel that they've reached a point of disability with regards to their knee. X-rays reveal advanced, end-stage degenerative osteoarthritis as noted by subchondral sclerosis, joint space narrowing in the medial, patellofemoral, lateral compartment, and periarticular osteophytes. The patient would like to proceed with surgical intervention and would be a good candidate for total knee replacement.    Total knee arthroplasty procedure, alternatives, risks, and benefits were discussed in detail. The risks including but not limited to: infection, need for revision surgery, pain, swelling, loosening, injury to surrounding neurovascular structures,  stiffness, incomplete resolution of pain, DVT, PE, and death were discussed in detail. Despite these risks, the patient would like to proceed with surgical intervention. All questions were answered, no guarantees made. Will plan for right TKA on 08/08/2022.      This note was created using [employer] voice recognition software that occasionally misinterpreted phrases or words.    Consult note is delivered via Epic messaging service.

## 2022-07-21 NOTE — PROGRESS NOTES
Past Medical History:   Diagnosis Date    Diabetes mellitus, type 2     High cholesterol     Hypertension     Osteoarthritis        Past Surgical History:   Procedure Laterality Date    appendix removal      BACK SURGERY         Current Outpatient Medications   Medication Sig    amLODIPine (NORVASC) 10 MG tablet Take 10 mg by mouth once daily.    atorvastatin (LIPITOR) 40 MG tablet TAKE 1 TABLET BY MOUTH 1 TIME IN THE EVENING    carvediloL (COREG) 6.25 MG tablet Take 6.25 mg by mouth 2 (two) times daily.    glimepiride (AMARYL) 4 MG tablet Take 4 mg by mouth 2 (two) times daily.    metFORMIN (GLUCOPHAGE) 500 MG tablet Take 500 mg by mouth 2 (two) times daily with meals.    naproxen sodium (ANAPROX) 220 MG tablet Take by mouth.    valsartan (DIOVAN) 160 MG tablet Take 160 mg by mouth once daily.     No current facility-administered medications for this visit.       Review of patient's allergies indicates:  No Known Allergies    Family History   Family history unknown: Yes       Social History     Socioeconomic History    Marital status:    Tobacco Use    Smoking status: Never Smoker    Smokeless tobacco: Never Used   Substance and Sexual Activity    Alcohol use: Not Currently    Drug use: Never       Chief Complaint:   Chief Complaint   Patient presents with    Pre-op Exam     Pre op for right TKA [facility]ISOL on 8/8/22       History of present illness: Moises San is a 72 y.o. male, presents to clinic today in regards to right knee pain. This pain is chronic in nature and worsened overtime. Pain is increased with bending, squatting, and prolonged ambulation. Located throughout the knee. He has tried OTC anti-inflammatories, multiple injections, and lifestyle modifications with little to no relief. This pain has affected his daily living activities. He feels as though he has reached a point of disability and would like to proceed with a right total knee arthroplasty.      Review of  Systems:    Denies fevers, chills, chest pain, shortness of breath. Comprehensive review of systems performed and otherwise negative except as noted in HPI     Physical Examination:    General: awake and alert, no acute distress, healthy appearing  Head and Neck: Head atraumatic/normocephalic. Moist MM  CV: brisk cap refill  Lungs: non-labored breathing, w/o cough or SOB  Skin: no rashes present, warm to touch  Neuro: sensation grossly intact distally       Vital Signs:  There were no vitals filed for this visit.    There is no height or weight on file to calculate BMI.    Right knee:  Sensation intact distally.    Brisk cap refill distally.    Patient with varus alignment that is not correctable.    He has significant crepitus throughout range of motion.  Extension of 5.  Flexion of 105.   Skin warm, dry and intact.       Assessment::Primary OA R knee    Plan:  At this point the patient is tried and failed all conservative management with regards to their right knee. They have tried and failed nonoperative management including: Anti-inflammatories, activity modification, injections. All of these have failed to completely remove their pain. They have pain going up and down stairs as well as walking on level ground. The knee pain is affecting activities of daily living They feel that they've reached a point of disability with regards to their knee. X-rays reveal advanced, end-stage degenerative osteoarthritis as noted by subchondral sclerosis, joint space narrowing in the medial, patellofemoral, lateral compartment, and periarticular osteophytes. The patient would like to proceed with surgical intervention and would be a good candidate for total knee replacement.    Total knee arthroplasty procedure, alternatives, risks, and benefits were discussed in detail. The risks including but not limited to: infection, need for revision surgery, pain, swelling, loosening, injury to surrounding neurovascular structures,  stiffness, incomplete resolution of pain, DVT, PE, and death were discussed in detail. Despite these risks, the patient would like to proceed with surgical intervention. All questions were answered, no guarantees made. Will plan for right TKA on 08/08/2022.      This note was created using [email or web address] voice recognition software that occasionally misinterpreted phrases or words.    Consult note is delivered via Epic messaging service.

## 2022-07-25 ENCOUNTER — ANESTHESIA EVENT (OUTPATIENT)
Dept: SURGERY | Facility: HOSPITAL | Age: 72
End: 2022-07-25
Payer: MEDICARE

## 2022-08-02 RX ORDER — CETIRIZINE HYDROCHLORIDE 10 MG/1
10 TABLET ORAL DAILY
COMMUNITY

## 2022-08-02 RX ORDER — ASPIRIN 81 MG/1
81 TABLET ORAL DAILY
COMMUNITY
End: 2022-08-09

## 2022-08-08 ENCOUNTER — ANESTHESIA (OUTPATIENT)
Dept: SURGERY | Facility: HOSPITAL | Age: 72
End: 2022-08-08
Payer: MEDICARE

## 2022-08-08 ENCOUNTER — HOSPITAL ENCOUNTER (OUTPATIENT)
Facility: HOSPITAL | Age: 72
LOS: 1 days | Discharge: HOME | End: 2022-08-10
Attending: ORTHOPAEDIC SURGERY | Admitting: ORTHOPAEDIC SURGERY
Payer: MEDICARE

## 2022-08-08 DIAGNOSIS — E11.9 TYPE 2 DIABETES MELLITUS WITHOUT COMPLICATION, WITHOUT LONG-TERM CURRENT USE OF INSULIN: ICD-10-CM

## 2022-08-08 DIAGNOSIS — Z01.818 PREOPERATIVE TESTING: ICD-10-CM

## 2022-08-08 DIAGNOSIS — M17.11 PRIMARY OSTEOARTHRITIS OF RIGHT KNEE: ICD-10-CM

## 2022-08-08 LAB
GLUCOSE SERPL-MCNC: 226 MG/DL (ref 70–110)
HCT VFR BLD AUTO: 37.3 % (ref 42–52)
HGB BLD-MCNC: 12.8 GM/DL (ref 14–18)
POCT GLUCOSE: 196 MG/DL (ref 70–110)
POCT GLUCOSE: 219 MG/DL (ref 70–110)

## 2022-08-08 PROCEDURE — 36415 COLL VENOUS BLD VENIPUNCTURE: CPT | Performed by: ORTHOPAEDIC SURGERY

## 2022-08-08 PROCEDURE — 36000713 HC OR TIME LEV V EA ADD 15 MIN: Performed by: ORTHOPAEDIC SURGERY

## 2022-08-08 PROCEDURE — 25000003 PHARM REV CODE 250: Performed by: NURSE ANESTHETIST, CERTIFIED REGISTERED

## 2022-08-08 PROCEDURE — 37000009 HC ANESTHESIA EA ADD 15 MINS: Performed by: ORTHOPAEDIC SURGERY

## 2022-08-08 PROCEDURE — 71000033 HC RECOVERY, INTIAL HOUR: Performed by: ORTHOPAEDIC SURGERY

## 2022-08-08 PROCEDURE — 25000003 PHARM REV CODE 250: Performed by: NURSE PRACTITIONER

## 2022-08-08 PROCEDURE — 36000712 HC OR TIME LEV V 1ST 15 MIN: Performed by: ORTHOPAEDIC SURGERY

## 2022-08-08 PROCEDURE — 11000001 HC ACUTE MED/SURG PRIVATE ROOM

## 2022-08-08 PROCEDURE — 51798 US URINE CAPACITY MEASURE: CPT

## 2022-08-08 PROCEDURE — 27447 PR TOTAL KNEE ARTHROPLASTY: ICD-10-PCS | Mod: AS,RT,, | Performed by: NURSE PRACTITIONER

## 2022-08-08 PROCEDURE — 94799 UNLISTED PULMONARY SVC/PX: CPT

## 2022-08-08 PROCEDURE — 20985 CPTR-ASST DIR MS PX: CPT | Mod: ,,, | Performed by: ORTHOPAEDIC SURGERY

## 2022-08-08 PROCEDURE — 25000003 PHARM REV CODE 250: Performed by: ORTHOPAEDIC SURGERY

## 2022-08-08 PROCEDURE — 63600175 PHARM REV CODE 636 W HCPCS: Performed by: ORTHOPAEDIC SURGERY

## 2022-08-08 PROCEDURE — 51798 US URINE CAPACITY MEASURE: CPT | Performed by: ORTHOPAEDIC SURGERY

## 2022-08-08 PROCEDURE — 63600175 PHARM REV CODE 636 W HCPCS: Mod: GZ

## 2022-08-08 PROCEDURE — 82962 GLUCOSE BLOOD TEST: CPT | Mod: 91 | Performed by: ORTHOPAEDIC SURGERY

## 2022-08-08 PROCEDURE — 63600175 PHARM REV CODE 636 W HCPCS: Performed by: NURSE PRACTITIONER

## 2022-08-08 PROCEDURE — 30000890 HC MISC. SEND OUT TEST: Performed by: ORTHOPAEDIC SURGERY

## 2022-08-08 PROCEDURE — 71000039 HC RECOVERY, EACH ADD'L HOUR: Performed by: ORTHOPAEDIC SURGERY

## 2022-08-08 PROCEDURE — 63600175 PHARM REV CODE 636 W HCPCS: Performed by: NURSE ANESTHETIST, CERTIFIED REGISTERED

## 2022-08-08 PROCEDURE — 82962 GLUCOSE BLOOD TEST: CPT | Performed by: ORTHOPAEDIC SURGERY

## 2022-08-08 PROCEDURE — 30000890 SPECIMEN TO PATHOLOGY: Performed by: ORTHOPAEDIC SURGERY

## 2022-08-08 PROCEDURE — 20985 PR CPTR-ASST SURGICAL NAVIGATION IMAGE-LESS: ICD-10-PCS | Mod: ,,, | Performed by: ORTHOPAEDIC SURGERY

## 2022-08-08 PROCEDURE — C1776 JOINT DEVICE (IMPLANTABLE): HCPCS | Performed by: ORTHOPAEDIC SURGERY

## 2022-08-08 PROCEDURE — C1713 ANCHOR/SCREW BN/BN,TIS/BN: HCPCS | Performed by: ORTHOPAEDIC SURGERY

## 2022-08-08 PROCEDURE — 88305 TISSUE EXAM BY PATHOLOGIST: CPT | Performed by: ORTHOPAEDIC SURGERY

## 2022-08-08 PROCEDURE — 27447 PR TOTAL KNEE ARTHROPLASTY: ICD-10-PCS | Mod: RT,,, | Performed by: ORTHOPAEDIC SURGERY

## 2022-08-08 PROCEDURE — 97162 PT EVAL MOD COMPLEX 30 MIN: CPT

## 2022-08-08 PROCEDURE — 27447 TOTAL KNEE ARTHROPLASTY: CPT | Mod: RT,,, | Performed by: ORTHOPAEDIC SURGERY

## 2022-08-08 PROCEDURE — 97530 THERAPEUTIC ACTIVITIES: CPT

## 2022-08-08 PROCEDURE — 37000008 HC ANESTHESIA 1ST 15 MINUTES: Performed by: ORTHOPAEDIC SURGERY

## 2022-08-08 PROCEDURE — 63600175 PHARM REV CODE 636 W HCPCS

## 2022-08-08 PROCEDURE — 85014 HEMATOCRIT: CPT | Performed by: ORTHOPAEDIC SURGERY

## 2022-08-08 PROCEDURE — 63600175 PHARM REV CODE 636 W HCPCS: Performed by: ANESTHESIOLOGY

## 2022-08-08 PROCEDURE — 27201423 OPTIME MED/SURG SUP & DEVICES STERILE SUPPLY: Performed by: ORTHOPAEDIC SURGERY

## 2022-08-08 PROCEDURE — 27447 TOTAL KNEE ARTHROPLASTY: CPT | Mod: AS,RT,, | Performed by: NURSE PRACTITIONER

## 2022-08-08 PROCEDURE — 88311 DECALCIFY TISSUE: CPT | Performed by: ORTHOPAEDIC SURGERY

## 2022-08-08 DEVICE — TIBIAL COMPONENT
Type: IMPLANTABLE DEVICE | Site: KNEE | Status: FUNCTIONAL
Brand: TRIATHLON

## 2022-08-08 DEVICE — CRUCIATE RETAINING FEMORAL
Type: IMPLANTABLE DEVICE | Site: KNEE | Status: FUNCTIONAL
Brand: TRIATHLON

## 2022-08-08 DEVICE — TIBIAL BEARING INSERT - CR
Type: IMPLANTABLE DEVICE | Site: KNEE | Status: FUNCTIONAL
Brand: TRIATHLON

## 2022-08-08 DEVICE — PATELLA
Type: IMPLANTABLE DEVICE | Site: KNEE | Status: FUNCTIONAL
Brand: TRIATHLON

## 2022-08-08 RX ORDER — ACETAMINOPHEN 500 MG
1000 TABLET ORAL
Status: COMPLETED | OUTPATIENT
Start: 2022-08-08 | End: 2022-08-08

## 2022-08-08 RX ORDER — MORPHINE SULFATE 10 MG/ML
INJECTION INTRAMUSCULAR; INTRAVENOUS; SUBCUTANEOUS
Status: DISCONTINUED | OUTPATIENT
Start: 2022-08-08 | End: 2022-08-08 | Stop reason: HOSPADM

## 2022-08-08 RX ORDER — GLYCOPYRROLATE 0.2 MG/ML
INJECTION INTRAMUSCULAR; INTRAVENOUS
Status: DISCONTINUED | OUTPATIENT
Start: 2022-08-08 | End: 2022-08-08

## 2022-08-08 RX ORDER — GLIMEPIRIDE 4 MG/1
4 TABLET ORAL 2 TIMES DAILY
Status: DISCONTINUED | OUTPATIENT
Start: 2022-08-08 | End: 2022-08-10 | Stop reason: HOSPADM

## 2022-08-08 RX ORDER — BUPIVACAINE HYDROCHLORIDE 7.5 MG/ML
INJECTION, SOLUTION EPIDURAL; RETROBULBAR
Status: COMPLETED | OUTPATIENT
Start: 2022-08-08 | End: 2022-08-08

## 2022-08-08 RX ORDER — DEXTROSE 50 % IN WATER (D50W) INTRAVENOUS SYRINGE
12.5
Status: DISCONTINUED | OUTPATIENT
Start: 2022-08-08 | End: 2022-08-10 | Stop reason: HOSPADM

## 2022-08-08 RX ORDER — CALC/MAG/B COMPLEX/D3/HERB 61
15 TABLET ORAL DAILY
COMMUNITY

## 2022-08-08 RX ORDER — POLYETHYLENE GLYCOL 3350 17 G/17G
17 POWDER, FOR SOLUTION ORAL NIGHTLY
Status: DISCONTINUED | OUTPATIENT
Start: 2022-08-08 | End: 2022-08-10 | Stop reason: HOSPADM

## 2022-08-08 RX ORDER — IBUPROFEN 200 MG
24 TABLET ORAL
Status: DISCONTINUED | OUTPATIENT
Start: 2022-08-08 | End: 2022-08-10 | Stop reason: HOSPADM

## 2022-08-08 RX ORDER — KETOROLAC TROMETHAMINE 30 MG/ML
INJECTION, SOLUTION INTRAMUSCULAR; INTRAVENOUS
Status: DISPENSED
Start: 2022-08-08 | End: 2022-08-08

## 2022-08-08 RX ORDER — INSULIN ASPART 100 [IU]/ML
1-10 INJECTION, SOLUTION INTRAVENOUS; SUBCUTANEOUS
Status: DISCONTINUED | OUTPATIENT
Start: 2022-08-08 | End: 2022-08-10 | Stop reason: HOSPADM

## 2022-08-08 RX ORDER — CARVEDILOL 6.25 MG/1
6.25 TABLET ORAL 2 TIMES DAILY
Status: DISCONTINUED | OUTPATIENT
Start: 2022-08-08 | End: 2022-08-10 | Stop reason: HOSPADM

## 2022-08-08 RX ORDER — SODIUM CHLORIDE 0.9 % (FLUSH) 0.9 %
SYRINGE (ML) INJECTION
Status: DISPENSED
Start: 2022-08-08 | End: 2022-08-08

## 2022-08-08 RX ORDER — METHOCARBAMOL 750 MG/1
750 TABLET, FILM COATED ORAL EVERY 8 HOURS PRN
Status: DISCONTINUED | OUTPATIENT
Start: 2022-08-08 | End: 2022-08-10 | Stop reason: HOSPADM

## 2022-08-08 RX ORDER — GABAPENTIN 300 MG/1
600 CAPSULE ORAL
Status: COMPLETED | OUTPATIENT
Start: 2022-08-08 | End: 2022-08-08

## 2022-08-08 RX ORDER — TRAMADOL HYDROCHLORIDE 50 MG/1
50 TABLET ORAL EVERY 4 HOURS PRN
Status: DISCONTINUED | OUTPATIENT
Start: 2022-08-08 | End: 2022-08-10

## 2022-08-08 RX ORDER — PHENYLEPHRINE HCL IN 0.9% NACL 1 MG/10 ML
SYRINGE (ML) INTRAVENOUS
Status: DISCONTINUED | OUTPATIENT
Start: 2022-08-08 | End: 2022-08-08

## 2022-08-08 RX ORDER — NAPROXEN SODIUM 220 MG/1
81 TABLET, FILM COATED ORAL 2 TIMES DAILY
Status: DISCONTINUED | OUTPATIENT
Start: 2022-08-09 | End: 2022-08-10 | Stop reason: HOSPADM

## 2022-08-08 RX ORDER — EPINEPHRINE 1 MG/ML
INJECTION, SOLUTION INTRACARDIAC; INTRAMUSCULAR; INTRAVENOUS; SUBCUTANEOUS
Status: DISCONTINUED | OUTPATIENT
Start: 2022-08-08 | End: 2022-08-08 | Stop reason: HOSPADM

## 2022-08-08 RX ORDER — ONDANSETRON 2 MG/ML
4 INJECTION INTRAMUSCULAR; INTRAVENOUS ONCE AS NEEDED
Status: DISCONTINUED | OUTPATIENT
Start: 2022-08-08 | End: 2022-08-08

## 2022-08-08 RX ORDER — AMLODIPINE BESYLATE 5 MG/1
10 TABLET ORAL DAILY
Status: DISCONTINUED | OUTPATIENT
Start: 2022-08-08 | End: 2022-08-10 | Stop reason: HOSPADM

## 2022-08-08 RX ORDER — KETOROLAC TROMETHAMINE 10 MG/1
10 TABLET, FILM COATED ORAL EVERY 6 HOURS
Status: DISCONTINUED | OUTPATIENT
Start: 2022-08-08 | End: 2022-08-09

## 2022-08-08 RX ORDER — CEFAZOLIN SODIUM 2 G/50ML
2 SOLUTION INTRAVENOUS
Status: DISCONTINUED | OUTPATIENT
Start: 2022-08-08 | End: 2022-08-08

## 2022-08-08 RX ORDER — ROPIVACAINE HYDROCHLORIDE 5 MG/ML
INJECTION, SOLUTION EPIDURAL; INFILTRATION; PERINEURAL
Status: DISPENSED
Start: 2022-08-08 | End: 2022-08-08

## 2022-08-08 RX ORDER — METFORMIN HYDROCHLORIDE 500 MG/1
500 TABLET ORAL 2 TIMES DAILY WITH MEALS
Status: DISCONTINUED | OUTPATIENT
Start: 2022-08-08 | End: 2022-08-08

## 2022-08-08 RX ORDER — BISACODYL 10 MG
10 SUPPOSITORY, RECTAL RECTAL DAILY
Status: DISCONTINUED | OUTPATIENT
Start: 2022-08-11 | End: 2022-08-10 | Stop reason: HOSPADM

## 2022-08-08 RX ORDER — HYDROMORPHONE HYDROCHLORIDE 2 MG/ML
0.2 INJECTION, SOLUTION INTRAMUSCULAR; INTRAVENOUS; SUBCUTANEOUS EVERY 5 MIN PRN
Status: DISCONTINUED | OUTPATIENT
Start: 2022-08-08 | End: 2022-08-08

## 2022-08-08 RX ORDER — HYDRALAZINE HYDROCHLORIDE 20 MG/ML
10 INJECTION INTRAMUSCULAR; INTRAVENOUS ONCE
Status: COMPLETED | OUTPATIENT
Start: 2022-08-08 | End: 2022-08-08

## 2022-08-08 RX ORDER — AMOXICILLIN 250 MG
2 CAPSULE ORAL 2 TIMES DAILY
Status: DISCONTINUED | OUTPATIENT
Start: 2022-08-08 | End: 2022-08-10 | Stop reason: HOSPADM

## 2022-08-08 RX ORDER — ONDANSETRON 2 MG/ML
INJECTION INTRAMUSCULAR; INTRAVENOUS
Status: DISCONTINUED | OUTPATIENT
Start: 2022-08-08 | End: 2022-08-08

## 2022-08-08 RX ORDER — KETOROLAC TROMETHAMINE 30 MG/ML
INJECTION, SOLUTION INTRAMUSCULAR; INTRAVENOUS
Status: DISCONTINUED | OUTPATIENT
Start: 2022-08-08 | End: 2022-08-08 | Stop reason: HOSPADM

## 2022-08-08 RX ORDER — LIDOCAINE HYDROCHLORIDE 10 MG/ML
1 INJECTION, SOLUTION EPIDURAL; INFILTRATION; INTRACAUDAL; PERINEURAL ONCE
Status: CANCELLED | OUTPATIENT
Start: 2022-08-08 | End: 2022-08-08

## 2022-08-08 RX ORDER — IBUPROFEN 200 MG
16 TABLET ORAL
Status: DISCONTINUED | OUTPATIENT
Start: 2022-08-08 | End: 2022-08-10 | Stop reason: HOSPADM

## 2022-08-08 RX ORDER — DOCUSATE SODIUM 100 MG/1
200 CAPSULE, LIQUID FILLED ORAL DAILY
Status: DISCONTINUED | OUTPATIENT
Start: 2022-08-09 | End: 2022-08-10 | Stop reason: HOSPADM

## 2022-08-08 RX ORDER — EPHEDRINE SULFATE 50 MG/ML
INJECTION, SOLUTION INTRAVENOUS
Status: DISCONTINUED | OUTPATIENT
Start: 2022-08-08 | End: 2022-08-08

## 2022-08-08 RX ORDER — SODIUM CHLORIDE 9 MG/ML
INJECTION, SOLUTION INTRAVENOUS CONTINUOUS
Status: DISCONTINUED | OUTPATIENT
Start: 2022-08-08 | End: 2022-08-09

## 2022-08-08 RX ORDER — HYDRALAZINE HYDROCHLORIDE 20 MG/ML
INJECTION INTRAMUSCULAR; INTRAVENOUS
Status: DISPENSED
Start: 2022-08-08 | End: 2022-08-09

## 2022-08-08 RX ORDER — VALSARTAN 160 MG/1
160 TABLET ORAL DAILY
Status: DISCONTINUED | OUTPATIENT
Start: 2022-08-08 | End: 2022-08-10 | Stop reason: HOSPADM

## 2022-08-08 RX ORDER — METOCLOPRAMIDE HYDROCHLORIDE 5 MG/ML
10 INJECTION INTRAMUSCULAR; INTRAVENOUS
Status: COMPLETED | OUTPATIENT
Start: 2022-08-08 | End: 2022-08-09

## 2022-08-08 RX ORDER — FAMOTIDINE 20 MG/1
20 TABLET, FILM COATED ORAL 2 TIMES DAILY
Status: DISCONTINUED | OUTPATIENT
Start: 2022-08-08 | End: 2022-08-10 | Stop reason: HOSPADM

## 2022-08-08 RX ORDER — EPINEPHRINE 1 MG/ML
INJECTION, SOLUTION INTRACARDIAC; INTRAMUSCULAR; INTRAVENOUS; SUBCUTANEOUS
Status: DISPENSED
Start: 2022-08-08 | End: 2022-08-08

## 2022-08-08 RX ORDER — METFORMIN HYDROCHLORIDE 500 MG/1
500 TABLET ORAL 2 TIMES DAILY WITH MEALS
Status: DISCONTINUED | OUTPATIENT
Start: 2022-08-09 | End: 2022-08-09

## 2022-08-08 RX ORDER — ROPIVACAINE HYDROCHLORIDE 5 MG/ML
INJECTION, SOLUTION EPIDURAL; INFILTRATION; PERINEURAL
Status: DISCONTINUED | OUTPATIENT
Start: 2022-08-08 | End: 2022-08-08 | Stop reason: HOSPADM

## 2022-08-08 RX ORDER — MORPHINE SULFATE 4 MG/ML
4 INJECTION, SOLUTION INTRAMUSCULAR; INTRAVENOUS
Status: ACTIVE | OUTPATIENT
Start: 2022-08-08 | End: 2022-08-09

## 2022-08-08 RX ORDER — ACETAMINOPHEN 10 MG/ML
1000 INJECTION, SOLUTION INTRAVENOUS ONCE
Status: COMPLETED | OUTPATIENT
Start: 2022-08-08 | End: 2022-08-08

## 2022-08-08 RX ORDER — HYDROCODONE BITARTRATE AND ACETAMINOPHEN 5; 325 MG/1; MG/1
1 TABLET ORAL EVERY 4 HOURS PRN
Status: DISCONTINUED | OUTPATIENT
Start: 2022-08-08 | End: 2022-08-10 | Stop reason: HOSPADM

## 2022-08-08 RX ORDER — ONDANSETRON 2 MG/ML
4 INJECTION INTRAMUSCULAR; INTRAVENOUS EVERY 6 HOURS PRN
Status: DISCONTINUED | OUTPATIENT
Start: 2022-08-08 | End: 2022-08-10 | Stop reason: HOSPADM

## 2022-08-08 RX ORDER — GABAPENTIN 300 MG/1
300 CAPSULE ORAL NIGHTLY
Status: DISCONTINUED | OUTPATIENT
Start: 2022-08-08 | End: 2022-08-10 | Stop reason: HOSPADM

## 2022-08-08 RX ORDER — FLUTICASONE PROPIONATE 50 MCG
1 SPRAY, SUSPENSION (ML) NASAL DAILY
COMMUNITY

## 2022-08-08 RX ORDER — CEFAZOLIN SODIUM 2 G/100ML
2 INJECTION, SOLUTION INTRAVENOUS
Status: COMPLETED | OUTPATIENT
Start: 2022-08-08 | End: 2022-08-09

## 2022-08-08 RX ORDER — LIDOCAINE HYDROCHLORIDE 20 MG/ML
INJECTION INTRAVENOUS
Status: DISCONTINUED | OUTPATIENT
Start: 2022-08-08 | End: 2022-08-08

## 2022-08-08 RX ORDER — GLUCAGON 1 MG
1 KIT INJECTION
Status: DISCONTINUED | OUTPATIENT
Start: 2022-08-08 | End: 2022-08-10 | Stop reason: HOSPADM

## 2022-08-08 RX ORDER — SODIUM CHLORIDE 9 MG/ML
INJECTION, SOLUTION INTRAVENOUS
Status: COMPLETED | OUTPATIENT
Start: 2022-08-08 | End: 2022-08-08

## 2022-08-08 RX ORDER — DEXTROSE 50 % IN WATER (D50W) INTRAVENOUS SYRINGE
25
Status: DISCONTINUED | OUTPATIENT
Start: 2022-08-08 | End: 2022-08-10 | Stop reason: HOSPADM

## 2022-08-08 RX ORDER — KETOROLAC TROMETHAMINE 30 MG/ML
15 INJECTION, SOLUTION INTRAMUSCULAR; INTRAVENOUS
Status: COMPLETED | OUTPATIENT
Start: 2022-08-08 | End: 2022-08-08

## 2022-08-08 RX ORDER — TRANEXAMIC ACID 650 MG/1
1950 TABLET ORAL
Status: COMPLETED | OUTPATIENT
Start: 2022-08-08 | End: 2022-08-08

## 2022-08-08 RX ORDER — MIDAZOLAM HYDROCHLORIDE 1 MG/ML
INJECTION INTRAMUSCULAR; INTRAVENOUS
Status: DISCONTINUED | OUTPATIENT
Start: 2022-08-08 | End: 2022-08-08

## 2022-08-08 RX ORDER — SCOLOPAMINE TRANSDERMAL SYSTEM 1 MG/1
1 PATCH, EXTENDED RELEASE TRANSDERMAL
Status: DISCONTINUED | OUTPATIENT
Start: 2022-08-08 | End: 2022-08-08

## 2022-08-08 RX ORDER — MAG HYDROX/ALUMINUM HYD/SIMETH 200-200-20
30 SUSPENSION, ORAL (FINAL DOSE FORM) ORAL EVERY 6 HOURS PRN
Status: DISCONTINUED | OUTPATIENT
Start: 2022-08-08 | End: 2022-08-10 | Stop reason: HOSPADM

## 2022-08-08 RX ORDER — CALCIUM CARBONATE 200(500)MG
500 TABLET,CHEWABLE ORAL 3 TIMES DAILY PRN
Status: DISCONTINUED | OUTPATIENT
Start: 2022-08-08 | End: 2022-08-10 | Stop reason: HOSPADM

## 2022-08-08 RX ORDER — MORPHINE SULFATE 10 MG/ML
INJECTION INTRAMUSCULAR; INTRAVENOUS; SUBCUTANEOUS
Status: DISPENSED
Start: 2022-08-08 | End: 2022-08-08

## 2022-08-08 RX ORDER — TALC
6 POWDER (GRAM) TOPICAL NIGHTLY PRN
Status: DISCONTINUED | OUTPATIENT
Start: 2022-08-08 | End: 2022-08-10 | Stop reason: HOSPADM

## 2022-08-08 RX ORDER — CEFAZOLIN SODIUM 2 G/100ML
2 INJECTION, SOLUTION INTRAVENOUS
Status: DISCONTINUED | OUTPATIENT
Start: 2022-08-08 | End: 2022-08-08

## 2022-08-08 RX ORDER — PROPOFOL 10 MG/ML
VIAL (ML) INTRAVENOUS CONTINUOUS PRN
Status: DISCONTINUED | OUTPATIENT
Start: 2022-08-08 | End: 2022-08-08

## 2022-08-08 RX ORDER — LACTULOSE 10 G/15ML
20 SOLUTION ORAL EVERY 6 HOURS PRN
Status: DISCONTINUED | OUTPATIENT
Start: 2022-08-08 | End: 2022-08-10 | Stop reason: HOSPADM

## 2022-08-08 RX ADMIN — INSULIN HUMAN 5 UNITS: 100 INJECTION, SOLUTION PARENTERAL at 12:08

## 2022-08-08 RX ADMIN — MIDAZOLAM 2 MG: 1 INJECTION INTRAMUSCULAR; INTRAVENOUS at 09:08

## 2022-08-08 RX ADMIN — SODIUM CHLORIDE: 9 INJECTION, SOLUTION INTRAVENOUS at 08:08

## 2022-08-08 RX ADMIN — SODIUM CHLORIDE, SODIUM GLUCONATE, SODIUM ACETATE, POTASSIUM CHLORIDE AND MAGNESIUM CHLORIDE: 526; 502; 368; 37; 30 INJECTION, SOLUTION INTRAVENOUS at 09:08

## 2022-08-08 RX ADMIN — CEFAZOLIN SODIUM 2000 MG: 2 INJECTION, SOLUTION INTRAVENOUS at 06:08

## 2022-08-08 RX ADMIN — POLYETHYLENE GLYCOL 3350 17 G: 17 POWDER, FOR SOLUTION ORAL at 08:08

## 2022-08-08 RX ADMIN — METFORMIN HYDROCHLORIDE 500 MG: 500 TABLET ORAL at 05:08

## 2022-08-08 RX ADMIN — GABAPENTIN 600 MG: 300 CAPSULE ORAL at 07:08

## 2022-08-08 RX ADMIN — NORETHINDRONE AND ETHINYL ESTRADIOL 20 MG: KIT ORAL at 11:08

## 2022-08-08 RX ADMIN — BUPIVACAINE HYDROCHLORIDE 1.8 ML: 7.5 INJECTION, SOLUTION EPIDURAL; RETROBULBAR at 09:08

## 2022-08-08 RX ADMIN — GLYCOPYRROLATE 0.2 MG: 0.2 INJECTION INTRAMUSCULAR; INTRAVENOUS at 09:08

## 2022-08-08 RX ADMIN — NORETHINDRONE AND ETHINYL ESTRADIOL 15 MG: KIT ORAL at 10:08

## 2022-08-08 RX ADMIN — KETOROLAC TROMETHAMINE 15 MG: 30 INJECTION, SOLUTION INTRAMUSCULAR at 07:08

## 2022-08-08 RX ADMIN — GLIMEPIRIDE 4 MG: 4 TABLET ORAL at 08:08

## 2022-08-08 RX ADMIN — KETOROLAC TROMETHAMINE 10 MG: 10 TABLET, FILM COATED ORAL at 04:08

## 2022-08-08 RX ADMIN — METOCLOPRAMIDE 10 MG: 5 INJECTION, SOLUTION INTRAMUSCULAR; INTRAVENOUS at 04:08

## 2022-08-08 RX ADMIN — FAMOTIDINE 20 MG: 20 TABLET ORAL at 08:08

## 2022-08-08 RX ADMIN — LIDOCAINE HYDROCHLORIDE 50 MG: 20 INJECTION INTRAVENOUS at 10:08

## 2022-08-08 RX ADMIN — AMLODIPINE BESYLATE 10 MG: 5 TABLET ORAL at 03:08

## 2022-08-08 RX ADMIN — Medication 100 MCG: at 10:08

## 2022-08-08 RX ADMIN — GABAPENTIN 300 MG: 300 CAPSULE ORAL at 08:08

## 2022-08-08 RX ADMIN — METFORMIN HYDROCHLORIDE 500 MG: 500 TABLET, FILM COATED ORAL at 08:08

## 2022-08-08 RX ADMIN — CEFAZOLIN SODIUM 2 G: 2 SOLUTION INTRAVENOUS at 10:08

## 2022-08-08 RX ADMIN — ACETAMINOPHEN 1000 MG: 500 TABLET ORAL at 07:08

## 2022-08-08 RX ADMIN — CARVEDILOL 6.25 MG: 6.25 TABLET, FILM COATED ORAL at 08:08

## 2022-08-08 RX ADMIN — PROPOFOL 100 MCG/KG/MIN: 10 INJECTION, EMULSION INTRAVENOUS at 10:08

## 2022-08-08 RX ADMIN — HYDRALAZINE HYDROCHLORIDE 10 MG: 20 INJECTION INTRAMUSCULAR; INTRAVENOUS at 12:08

## 2022-08-08 RX ADMIN — TRAMADOL HYDROCHLORIDE 50 MG: 50 TABLET, COATED ORAL at 03:08

## 2022-08-08 RX ADMIN — PHENYLEPHRINE HYDROCHLORIDE 10 MCG/MIN: 10 INJECTION INTRAVENOUS at 10:08

## 2022-08-08 RX ADMIN — ACETAMINOPHEN 1000 MG: 10 INJECTION INTRAVENOUS at 08:08

## 2022-08-08 RX ADMIN — TRAMADOL HYDROCHLORIDE 50 MG: 50 TABLET, COATED ORAL at 08:08

## 2022-08-08 RX ADMIN — INSULIN HUMAN 5 UNITS: 100 INJECTION, SOLUTION PARENTERAL at 09:08

## 2022-08-08 RX ADMIN — ONDANSETRON HYDROCHLORIDE 4 MG: 2 SOLUTION INTRAMUSCULAR; INTRAVENOUS at 10:08

## 2022-08-08 RX ADMIN — INSULIN ASPART 2 UNITS: 100 INJECTION, SOLUTION INTRAVENOUS; SUBCUTANEOUS at 04:08

## 2022-08-08 RX ADMIN — SENNOSIDES AND DOCUSATE SODIUM 2 TABLET: 50; 8.6 TABLET ORAL at 08:08

## 2022-08-08 RX ADMIN — VALSARTAN 160 MG: 160 TABLET, FILM COATED ORAL at 03:08

## 2022-08-08 RX ADMIN — TRANEXAMIC ACID 1950 MG: 650 TABLET ORAL at 07:08

## 2022-08-08 RX ADMIN — METHOCARBAMOL 750 MG: 750 TABLET ORAL at 06:08

## 2022-08-08 NOTE — OP NOTE
OPERATIVE REPORT    Patient: Moises San   : 1950    MRN: [id number]  Date: 2022      Surgeon: Derek Whalen MD  Assistant: Nakia Mukherjee NP  Assistant was essential, part of the procedure including positioning, holding multiple retractors, deep hardware placement and deep closure.    Preoperative Diagnosis:  R knee primary osteoarthritis  Postoperative Diagnosis: Same  Procedure:  R MARISOL TKA (70201)  Anesthesiologist: Claudy Drake MD  OR Staff: Nurse Practitioner: JERRY Heaton  Scrub Person: Polly Bryant RN; Ivania Fleming  Registered Nurse: Antoni Mills RN  Implants:   Implant Name Type Inv. Item Serial No.  Lot No. LRB No. Used Action   PIN BONE [id number] - [id number]  PIN BONE [id number]  TAYLOR [id number] LINDSAY.  Right 1 Implanted and Explanted   PATELLA TRIATHLON [id number] ASYM - [id number]  PATELLA TRIATHLON [id number] ASYM  TAYLOR SALES LINDSAY.  Right 1 Implanted   tibial bearing insert- cr    TAYLOR [id number] Right 1 Implanted   BASEPLATE TIBIAL TRIATHLON SZ - [id number]  BASEPLATE TIBIAL TRIATHLON SZ  TAYLOR SALES LINDSAY. [id number] Right 1 Implanted   COMP FEM CR BEAD SZ 4 RIGHT - [id number]  COMP FEM CR BEAD SZ 4 RIGHT  TAYLOR SALES LINDSAY. [id number] Right 1 Implanted     EBL: 65  Complications: None  Disposition: To PACU, stable    Indications: Moises San is a 72 y.o. male presenting with R knee pain.  Patient had tried and failed all conservative measures including injections, activity modification, NSAIDs, and home exercise program.  Risks, benefits, and alternatives discussed with regards to right total knee arthroplasty.  All questions answered to patients satisfaction, no guarantees made.  Despite these risks, the patient agreed to proceed with surgical intervention.     Procedure Note:  The patient was brought back to the OR and placed supine on the OR table. After successful induction of anesthesia by anesthesia staff, all bony prominences were padded  appropriately.  Tourniquet placed to the right upper thigh.  Right knee prepped and draped in normal sterile fashion. At this time, a time-out was performed, with the correct patient, site, and procedure identified.  Preoperative antibiotics were verified as administered.     Leg was exsanguinated using Esmarch tourniquet and tourniquet was inflated to 250 mmHg.  Standard midline parapatellar incision was performed taken through the skin and subcutaneous tissue.  A fresh knife was used to make an arthrotomy midline parapatellar approach.  Proximal medial tibia retinaculam was released from the proximal medial tibial plateau.  Small resection of prepatellar fat pad was performed.  Knee flexed up.  Schanz pins attached to the distal femur as well as distal tibia.  Mustbin robotic arrays attached to both sets of pins.  Checkpoints attached to distal femur and proximal tibia.  Registration performed initially with hip center followed by ankle center.  And registration of both the distal femur and proximal tibia performed using Juan F protocol.  We then checked joint balance and ligamentous tension in both extension as well as 90° of flexion.  Adjusted femoral and tibial implants to get appropriate gaps in both extension as well as flexion.  After we were satisfied with implant position as well as volume resection, Mustbin robot was brought in.  Femoral cuts performed followed by tibial cuts.  Care performed to protect all necessary soft tissue structures during bony resection.  Bony fragments and resected and removed from the knee.  Medial and lateral meniscus resected.  Tibial tray placed in the position and pinned.  Trial liner placed in position.  Trial femoral component placed in position and adjusted medially and laterally to the appropriate position.  Knee taken to full range of motion.  Came to full extension.  Excellent flexion greater than 120°.  Stable throughout his range of motion with no extension instability or  flexion instability.  Patella tracked well.    Patella was everted.  Thickness measured.  10 mm of bone was resected from the patella.  It was sized at this point as well.  Femoral lugs drilled and the femoral trial removed.  We then punched the tibia with a tibial tower.  Tibial trial then removed.  Implants opened.  Tibia component implanted, followed by the polyethylene liner.  Femoral component then implanted.  Patella was everted and lugs drilled and the patella was also implanted.  At this point brought the knee to full range of motion yet again.  Again excellent range of motion from full extension greater than 120° of flexion was noted with no instability at full flexion, mid flexion and extension.  Checkpoints removed as well as pins.    The tourniquet was dropped.  All bleeders were coagulated.  Injection of joint cocktail periarticularly.  Copious irrigation with normal saline performed along with Betadine lavage followed by more normal saline.  We then turned our attention towards closure.  Arthrotomy closed with #1 Vicryl as well as #1 Stratafix suture.  Subcutaneous tissue closed with 2-0 Monocryl.  Followed by skin closure.    Patient arose from anesthesia without any issue and was then subsequently transferred to to PACU in a stable condition.    All sponge and needle counts were correct at the end of the case.  I was present and participated in all aspects of the procedure.    Prognosis:  The patient will be weight-bearing as tolerated to the right lower extremity with range of motion with physical therapy.  Patient will receive DVT prophylaxis .   plan discharge home versus a facility once the patient is stable with physical therapy guidelines.      This note/OR report was created with the assistance of  voice recognition software or phone  dictation.  There may be transcription errors as a result of using this technology however minimal. Effort has been made to assure accuracy of transcription but  any obvious errors or omissions should be clarified with the author of the document.

## 2022-08-08 NOTE — PT/OT/SLP EVAL
"Physical Therapy Evaluation    Patient Name:  Moises San   MRN:  [id number]    Recommendations:     Discharge Recommendations:  outpatient PT   Discharge Equipment Recommendations: walker, rolling   Barriers to discharge: None    Assessment:     Moises San is a 72 y.o. male admitted with a medical diagnosis of Primary osteoarthritis of right knee.      He presents with the following impairments/functional limitations:  weakness, gait instability, decreased ROM, impaired endurance, impaired balance, decreased safety awareness, impaired muscle length, orthopedic precautions, pain, impaired functional mobility.    Rehab Prognosis: Good; patient would benefit from acute skilled PT services to address these deficits and reach maximum level of function.    Recent Surgery: Procedure(s) (LRB):  ROBOTIC ARTHROPLASTY, KNEE, TOTAL (Right) Day of Surgery    Pt is Oriented x3 and Alert, Cooperative and Motivated      Plan:     During this hospitalization, patient to be seen BID to address the identified rehab impairments via gait training, therapeutic activities, therapeutic exercises and progress toward the following goals:    · Plan of Care Expires:  08/15/22    Subjective     Chief Complaint:   Patient/Family Comments/goals: "be normal without pain"  Pain/Comfort:  · Pain Rating 1: 4/10  · Location - Side 1: Right  · Location 1: knee  · Pain Addressed 1: Pre-medicate for activity, Reposition, Nurse notified  · Pain Rating Post-Intervention 1: 4/10    Patients cultural, spiritual, Restorationist conflicts given the current situation: no    Living Environment:  Pt lives with spouse, has 4 steps with B HR to enter house and no steps inside house.   Prior to admission, patients level of function was independent. Prior responsibilities include: driving, not working and household tasks. Equipment used at home: cane, straight, rollator.  DME owned (not currently used): none.  Pt will d/c to son's house with threshold to enter " and no steps inside.      Pt stated they performed HEP/attend prehab prior to sx: no    Objective:     Communicated with RN prior to session.  Patient found supine with peripheral IV, blood pressure cuff, pulse ox (continuous), Condom Catheter  upon PT entry to room.    General Precautions: Standard, fall   Orthopedic Precautions:RLE weight bearing as tolerated   Braces: N/A  Respiratory Status: Room air    Exams:  · Sensation:    · -       Intact  · RLE ROM:       (In degrees) AROM PROM   R knee flexion 91 96   R knee extension 8 5       · RLE Strength: NT due to sx site  · LLE ROM: WFL  · LLE Strength: WFL      Functional Mobility:  · Bed Mobility:  Scooting: minimum assistance  · Supine to Sit: minimum assistance  · Transfers:     · Sit to Stand:  minimum assistance with rolling walker  · Gait: 300' with RW and min A    Other tasks performed:   N/A      Patient left up in chair with all lines intact, call button in reach, RN notified and son present.    GOALS:   Multidisciplinary Problems     Physical Therapy Goals        Problem: Physical Therapy    Goal Priority Disciplines Outcome Goal Variances Interventions   Physical Therapy Goal     PT, PT/OT Ongoing, Progressing     Description: Pt will improve functional independence by performing:    Bed mobility: SBA  Sit to stand: SBA  Car Transfer: Min A  with rolling walker  Ambulation x 200'  feet with SBA and rolling walker  1 Step (Curb): Min A  and rolling walker  3 Steps: Min A  and B HR  right knee AROM flexion (in degrees): 105  right knee AROM extension (in degrees): 0   Independent with total knee HEP                       History:     Past Medical History:   Diagnosis Date    Diabetes mellitus, type 2     High cholesterol     Hypertension     Osteoarthritis     PAD (peripheral artery disease)        Past Surgical History:   Procedure Laterality Date    appendix removal      BACK SURGERY      peripheral stents Left        Time Tracking:     PT  Received On:    PT Start Time: 1442     PT Stop Time: 1515  PT Total Time (min): 33 min     Billable Minutes: Evaluation 23 and Therapeutic Activity 10      08/08/2022

## 2022-08-08 NOTE — TRANSFER OF CARE
Anesthesia Transfer of Care Note    Patient: Moises San    Procedure(s) Performed: Procedure(s) (LRB):  ROBOTIC ARTHROPLASTY, KNEE, TOTAL (Right)    Patient location: PACU    Anesthesia Type: spinal    Transport from OR: Transported from OR on room air with adequate spontaneous ventilation    Post pain: adequate analgesia    Post assessment: no apparent anesthetic complications    Post vital signs: stable    Level of consciousness: awake and alert    Nausea/Vomiting: no nausea/vomiting    Complications: none    Transfer of care protocol was followed

## 2022-08-08 NOTE — ANESTHESIA POSTPROCEDURE EVALUATION
Anesthesia Post Evaluation    Patient: Moises San    Procedure(s) Performed: Procedure(s) (LRB):  ROBOTIC ARTHROPLASTY, KNEE, TOTAL (Right)    Final Anesthesia Type: general      Patient location during evaluation: floor  Patient participation: Yes- Able to Participate  Level of consciousness: awake and oriented  Post-procedure vital signs: reviewed and stable  Pain management: adequate  Airway patency: patent  AALIYAH mitigation strategies: Use of major conduction anesthesia (spinal/epidural) or peripheral nerve block  PONV status at discharge: No PONV  Anesthetic complications: no      Cardiovascular status: stable and hemodynamically stable  Respiratory status: unassisted, spontaneous ventilation and room air  Hydration status: euvolemic  Follow-up not needed.    Neuro: resolving neuraxial block      Vitals Value Taken Time   /63 08/08/22 1234   Temp 36.6 °C (97.9 °F) 08/08/22 1220   Pulse 60 08/08/22 1234   Resp 20 08/08/22 1230   SpO2 98 % 08/08/22 1234         Event Time   Out of Recovery 12:32:00         Pain/Kadie Score: Pain Rating Prior to Med Admin: 4 (8/8/2022  7:56 AM)  Kadie Score: 9 (8/8/2022 12:30 PM)

## 2022-08-08 NOTE — ANESTHESIA PREPROCEDURE EVALUATION
08/08/2022  Moises San is a 72 y.o., male , who presents for the following:    Procedure: ROBOTIC ARTHROPLASTY, KNEE, TOTAL (Right Knee)   Anesthesia type: Spinal   Diagnosis: Primary osteoarthritis of right knee [M17.11]       Pre-op Assessment    I have reviewed the Patient Summary Reports.     I have reviewed the Nursing Notes. I have reviewed the NPO Status.      Review of Systems  Anesthesia Hx:  No problems with previous Anesthesia    Social:  Former Smoker    Cardiovascular:   Hypertension hyperlipidemia PAD   Pulmonary:  Pulmonary Normal    Endocrine:   Diabetes, type 2        Physical Exam  General: Alert and Oriented    Airway:  Mallampati: II   Mouth Opening: Normal  TM Distance: Normal  Tongue: Normal  Neck ROM: Normal ROM    Dental:Missing numerous teeth  Upper Dentures  Chest/Lungs:  Normal Respiratory Rate    Heart:  Rate: Normal  Rhythm: Regular Rhythm      LAD:  CBC:  Lab Results   Component Value Date    WBC 6.4 07/21/2022    RBC 4.61 (L) 07/21/2022    HGB 14.0 07/21/2022    HCT 41.3 (L) 07/21/2022     PLT's 224 K  CMP:   Lab Results   Component Value Date    CHLORIDE 106 07/21/2022    CO2 24 07/21/2022    BUN 13.0 07/21/2022    CREATININE 1.07 07/21/2022    GLUCOSE 224 (H) 07/21/2022    CALCIUM 9.4 07/21/2022     K+ 5.1    INR:  No results found for: PT, INR, PROTIME, APTT     PET(2020) :   - normal Stress Test   - small reversable abnormality, mod. Intensity, apical/inferior segment.   - LVEF 56%    EKG:      Anesthesia Plan  Type of Anesthesia, risks & benefits discussed:    Anesthesia Type: Spinal, Gen Natural Airway  Intra-op Monitoring Plan: Standard ASA Monitors  Post Op Pain Control Plan: multimodal analgesia and IV/PO Opioids PRN  Induction:  IV  Airway Plan: Direct  Informed Consent: Informed consent signed with the Patient and all parties understand the risks and agree with  anesthesia plan.  All questions answered. Patient consented to blood products? Yes  ASA Score: 3  Day of Surgery Review of History & Physical: H&P Update referred to the surgeon/provider.  Anesthesia Plan Notes: Kimberley-op glucose monitoring    Ready For Surgery From Anesthesia Perspective.     .

## 2022-08-08 NOTE — ANESTHESIA PROCEDURE NOTES
Spinal    Diagnosis: OA , Right Knee  Patient location during procedure: OR  Start time: 8/8/2022 9:49 AM  Timeout: 8/8/2022 9:48 AM  End time: 8/8/2022 9:59 AM    Staffing  Authorizing Provider: Claudy Drake MD  Performing Provider: Claudy Drake MD    Preanesthetic Checklist  Completed: patient identified, IV checked, site marked, risks and benefits discussed, surgical consent, monitors and equipment checked, pre-op evaluation and timeout performed  Spinal Block  Patient position: sitting  Prep: ChloraPrep  Patient monitoring: heart rate, cardiac monitor and continuous pulse ox  Approach: midline  Location: L3-4  Injection technique: single shot  Needle  Needle type: pencil-tip   Needle gauge: 25 G  Additional Documentation: negative aspiration for heme and no paresthesia on injection  Needle localization: anatomical landmarks  Assessment  Ease of block: difficult  Patient's tolerance of the procedure: no complaints  Additional Notes  Difficult spinal block 2nd to scar tissue w/ tight IS  Medications:    Medications: bupivacaine (pf) (MARCAINE) injection 0.75% - Intraspinal   1.8 mL - 8/8/2022 9:48:00 AM

## 2022-08-08 NOTE — PLAN OF CARE
Problem: Physical Therapy  Goal: Physical Therapy Goal  Description: Pt will improve functional independence by performing:    Bed mobility: SBA  Sit to stand: SBA  Car Transfer: Min A  with rolling walker  Ambulation x 200'  feet with SBA and rolling walker  1 Step (Curb): Min A  and rolling walker  3 Steps: Min A  and B HR  right knee AROM flexion (in degrees): 105  right knee AROM extension (in degrees): 0   Independent with total knee HEP      Outcome: Ongoing, Progressing

## 2022-08-09 LAB
ANION GAP SERPL CALC-SCNC: 11 MEQ/L
BUN SERPL-MCNC: 10.8 MG/DL (ref 8.4–25.7)
CALCIUM SERPL-MCNC: 8.3 MG/DL (ref 8.8–10)
CHLORIDE SERPL-SCNC: 104 MMOL/L (ref 98–107)
CO2 SERPL-SCNC: 22 MMOL/L (ref 23–31)
CREAT SERPL-MCNC: 0.92 MG/DL (ref 0.73–1.18)
CREAT/UREA NIT SERPL: 12
ERYTHROCYTE [DISTWIDTH] IN BLOOD BY AUTOMATED COUNT: 12.2 % (ref 11.5–17)
GFR SERPLBLD CREATININE-BSD FMLA CKD-EPI: >60 MLS/MIN/1.73/M2
GLUCOSE SERPL-MCNC: 198 MG/DL (ref 82–115)
HCT VFR BLD AUTO: 37.2 % (ref 42–52)
HGB BLD-MCNC: 12.8 GM/DL (ref 14–18)
MCH RBC QN AUTO: 30.5 PG (ref 27–31)
MCHC RBC AUTO-ENTMCNC: 34.4 MG/DL (ref 33–36)
MCV RBC AUTO: 88.8 FL (ref 80–94)
NRBC BLD AUTO-RTO: 0 %
PLATELET # BLD AUTO: 177 X10(3)/MCL (ref 130–400)
PMV BLD AUTO: 9.4 FL (ref 7.4–10.4)
POCT GLUCOSE: 150 MG/DL (ref 70–110)
POCT GLUCOSE: 152 MG/DL (ref 70–110)
POCT GLUCOSE: 167 MG/DL (ref 70–110)
POCT GLUCOSE: 189 MG/DL (ref 70–110)
POCT GLUCOSE: 211 MG/DL (ref 70–110)
POCT GLUCOSE: 226 MG/DL (ref 70–110)
POTASSIUM SERPL-SCNC: 4.6 MMOL/L (ref 3.5–5.1)
RBC # BLD AUTO: 4.19 X10(6)/MCL (ref 4.7–6.1)
SODIUM SERPL-SCNC: 137 MMOL/L (ref 136–145)
WBC # SPEC AUTO: 6.2 X10(3)/MCL (ref 4.5–11.5)

## 2022-08-09 PROCEDURE — G0378 HOSPITAL OBSERVATION PER HR: HCPCS

## 2022-08-09 PROCEDURE — 97530 THERAPEUTIC ACTIVITIES: CPT

## 2022-08-09 PROCEDURE — 80048 BASIC METABOLIC PNL TOTAL CA: CPT | Performed by: ORTHOPAEDIC SURGERY

## 2022-08-09 PROCEDURE — 96361 HYDRATE IV INFUSION ADD-ON: CPT | Performed by: ORTHOPAEDIC SURGERY

## 2022-08-09 PROCEDURE — 94799 UNLISTED PULMONARY SVC/PX: CPT | Mod: 59

## 2022-08-09 PROCEDURE — 96374 THER/PROPH/DIAG INJ IV PUSH: CPT | Performed by: ORTHOPAEDIC SURGERY

## 2022-08-09 PROCEDURE — 85027 COMPLETE CBC AUTOMATED: CPT | Performed by: ORTHOPAEDIC SURGERY

## 2022-08-09 PROCEDURE — 97110 THERAPEUTIC EXERCISES: CPT

## 2022-08-09 PROCEDURE — 36415 COLL VENOUS BLD VENIPUNCTURE: CPT | Performed by: ORTHOPAEDIC SURGERY

## 2022-08-09 PROCEDURE — 63600175 PHARM REV CODE 636 W HCPCS: Mod: GZ | Performed by: ORTHOPAEDIC SURGERY

## 2022-08-09 PROCEDURE — 94761 N-INVAS EAR/PLS OXIMETRY MLT: CPT

## 2022-08-09 PROCEDURE — 96372 THER/PROPH/DIAG INJ SC/IM: CPT | Performed by: ORTHOPAEDIC SURGERY

## 2022-08-09 PROCEDURE — 97116 GAIT TRAINING THERAPY: CPT

## 2022-08-09 PROCEDURE — 25000003 PHARM REV CODE 250: Performed by: NURSE PRACTITIONER

## 2022-08-09 PROCEDURE — 25000003 PHARM REV CODE 250: Performed by: ORTHOPAEDIC SURGERY

## 2022-08-09 RX ORDER — HYDROCODONE BITARTRATE AND ACETAMINOPHEN 5; 325 MG/1; MG/1
1 TABLET ORAL EVERY 4 HOURS PRN
Qty: 42 TABLET | Refills: 0 | Status: SHIPPED | OUTPATIENT
Start: 2022-08-09 | End: 2022-08-10 | Stop reason: HOSPADM

## 2022-08-09 RX ORDER — METFORMIN HYDROCHLORIDE 500 MG/1
1000 TABLET ORAL
Status: DISCONTINUED | OUTPATIENT
Start: 2022-08-09 | End: 2022-08-09

## 2022-08-09 RX ORDER — CLONIDINE HYDROCHLORIDE 0.1 MG/1
0.1 TABLET ORAL EVERY 8 HOURS PRN
Status: DISCONTINUED | OUTPATIENT
Start: 2022-08-09 | End: 2022-08-10 | Stop reason: HOSPADM

## 2022-08-09 RX ORDER — POLYETHYLENE GLYCOL 3350 17 G/17G
17 POWDER, FOR SOLUTION ORAL DAILY
Qty: 14 EACH | Refills: 0 | Status: SHIPPED | OUTPATIENT
Start: 2022-08-09 | End: 2022-08-23

## 2022-08-09 RX ORDER — METFORMIN HYDROCHLORIDE 500 MG/1
1000 TABLET ORAL 2 TIMES DAILY WITH MEALS
Status: DISCONTINUED | OUTPATIENT
Start: 2022-08-09 | End: 2022-08-10 | Stop reason: HOSPADM

## 2022-08-09 RX ORDER — METHOCARBAMOL 750 MG/1
750 TABLET, FILM COATED ORAL EVERY 6 HOURS
Qty: 56 TABLET | Refills: 0 | Status: SHIPPED | OUTPATIENT
Start: 2022-08-09 | End: 2022-08-23

## 2022-08-09 RX ORDER — METFORMIN HYDROCHLORIDE 500 MG/1
1000 TABLET ORAL 2 TIMES DAILY WITH MEALS
Start: 2022-08-09

## 2022-08-09 RX ORDER — METFORMIN HYDROCHLORIDE 500 MG/1
500 TABLET ORAL NIGHTLY
Status: DISCONTINUED | OUTPATIENT
Start: 2022-08-09 | End: 2022-08-09

## 2022-08-09 RX ORDER — ACETAMINOPHEN 500 MG
500 TABLET ORAL
Status: DISCONTINUED | OUTPATIENT
Start: 2022-08-09 | End: 2022-08-10 | Stop reason: HOSPADM

## 2022-08-09 RX ADMIN — INSULIN ASPART 2 UNITS: 100 INJECTION, SOLUTION INTRAVENOUS; SUBCUTANEOUS at 06:08

## 2022-08-09 RX ADMIN — HYDROCODONE BITARTRATE AND ACETAMINOPHEN 1 TABLET: 5; 325 TABLET ORAL at 10:08

## 2022-08-09 RX ADMIN — GABAPENTIN 300 MG: 300 CAPSULE ORAL at 09:08

## 2022-08-09 RX ADMIN — FAMOTIDINE 20 MG: 20 TABLET ORAL at 09:08

## 2022-08-09 RX ADMIN — KETOROLAC TROMETHAMINE 10 MG: 10 TABLET, FILM COATED ORAL at 12:08

## 2022-08-09 RX ADMIN — SENNOSIDES AND DOCUSATE SODIUM 2 TABLET: 50; 8.6 TABLET ORAL at 09:08

## 2022-08-09 RX ADMIN — METOCLOPRAMIDE 10 MG: 5 INJECTION, SOLUTION INTRAMUSCULAR; INTRAVENOUS at 12:08

## 2022-08-09 RX ADMIN — METOCLOPRAMIDE 10 MG: 5 INJECTION, SOLUTION INTRAMUSCULAR; INTRAVENOUS at 06:08

## 2022-08-09 RX ADMIN — ACETAMINOPHEN 500 MG: 500 TABLET ORAL at 09:08

## 2022-08-09 RX ADMIN — METFORMIN HYDROCHLORIDE 1000 MG: 500 TABLET ORAL at 05:08

## 2022-08-09 RX ADMIN — GLIMEPIRIDE 4 MG: 4 TABLET ORAL at 09:08

## 2022-08-09 RX ADMIN — METHOCARBAMOL 750 MG: 750 TABLET ORAL at 05:08

## 2022-08-09 RX ADMIN — TRAMADOL HYDROCHLORIDE 50 MG: 50 TABLET, COATED ORAL at 06:08

## 2022-08-09 RX ADMIN — HYDROCODONE BITARTRATE AND ACETAMINOPHEN 1 TABLET: 5; 325 TABLET ORAL at 02:08

## 2022-08-09 RX ADMIN — DOCUSATE SODIUM 200 MG: 100 CAPSULE, LIQUID FILLED ORAL at 06:08

## 2022-08-09 RX ADMIN — CLONIDINE HYDROCHLORIDE 0.1 MG: 0.1 TABLET ORAL at 05:08

## 2022-08-09 RX ADMIN — CEFAZOLIN SODIUM 2000 MG: 2 INJECTION, SOLUTION INTRAVENOUS at 06:08

## 2022-08-09 RX ADMIN — CEFAZOLIN SODIUM 2000 MG: 2 INJECTION, SOLUTION INTRAVENOUS at 12:08

## 2022-08-09 RX ADMIN — ASPIRIN 81 MG CHEWABLE TABLET 81 MG: 81 TABLET CHEWABLE at 09:08

## 2022-08-09 RX ADMIN — CARVEDILOL 6.25 MG: 6.25 TABLET, FILM COATED ORAL at 09:08

## 2022-08-09 RX ADMIN — METFORMIN HYDROCHLORIDE 1000 MG: 500 TABLET ORAL at 09:08

## 2022-08-09 RX ADMIN — INSULIN ASPART 2 UNITS: 100 INJECTION, SOLUTION INTRAVENOUS; SUBCUTANEOUS at 09:08

## 2022-08-09 RX ADMIN — VALSARTAN 160 MG: 160 TABLET, FILM COATED ORAL at 09:08

## 2022-08-09 RX ADMIN — TRAMADOL HYDROCHLORIDE 50 MG: 50 TABLET, COATED ORAL at 12:08

## 2022-08-09 RX ADMIN — POLYETHYLENE GLYCOL 3350 17 G: 17 POWDER, FOR SOLUTION ORAL at 09:08

## 2022-08-09 RX ADMIN — KETOROLAC TROMETHAMINE 10 MG: 10 TABLET, FILM COATED ORAL at 06:08

## 2022-08-09 RX ADMIN — INSULIN ASPART 4 UNITS: 100 INJECTION, SOLUTION INTRAVENOUS; SUBCUTANEOUS at 12:08

## 2022-08-09 RX ADMIN — HYDROCODONE BITARTRATE AND ACETAMINOPHEN 1 TABLET: 5; 325 TABLET ORAL at 09:08

## 2022-08-09 RX ADMIN — AMLODIPINE BESYLATE 10 MG: 5 TABLET ORAL at 09:08

## 2022-08-09 NOTE — PLAN OF CARE
Problem: Adult Inpatient Plan of Care  Goal: Plan of Care Review  Outcome: Ongoing, Progressing  Goal: Patient-Specific Goal (Individualized)  Outcome: Ongoing, Progressing  Goal: Absence of Hospital-Acquired Illness or Injury  Outcome: Ongoing, Progressing  Goal: Optimal Comfort and Wellbeing  Outcome: Ongoing, Progressing  Goal: Readiness for Transition of Care  Outcome: Ongoing, Progressing     Problem: Infection  Goal: Absence of Infection Signs and Symptoms  Outcome: Ongoing, Progressing     Problem: Adjustment to Surgery (Knee Arthroplasty)  Goal: Optimal Coping  Outcome: Ongoing, Progressing     Problem: Bleeding (Knee Arthroplasty)  Goal: Absence of Bleeding  Outcome: Ongoing, Progressing     Problem: Bowel Motility Impaired (Knee Arthroplasty)  Goal: Effective Bowel Elimination  Outcome: Ongoing, Progressing     Problem: Fluid and Electrolyte Imbalance (Knee Arthroplasty)  Goal: Fluid and Electrolyte Balance  Outcome: Ongoing, Progressing     Problem: Functional Ability Impaired (Knee Arthroplasty)  Goal: Optimal Functional Ability  Outcome: Ongoing, Progressing     Problem: Infection (Knee Arthroplasty)  Goal: Absence of Infection Signs and Symptoms  Outcome: Ongoing, Progressing     Problem: Neurovascular Compromise (Knee Arthroplasty)  Goal: Intact Neurovascular Status  Outcome: Ongoing, Progressing     Problem: Ongoing Anesthesia Effects (Knee Arthroplasty)  Goal: Anesthesia/Sedation Recovery  Outcome: Ongoing, Progressing     Problem: Pain (Knee Arthroplasty)  Goal: Acceptable Pain Control  Outcome: Ongoing, Progressing     Problem: Postoperative Nausea and Vomiting (Knee Arthroplasty)  Goal: Nausea and Vomiting Relief  Outcome: Ongoing, Progressing     Problem: Postoperative Urinary Retention (Knee Arthroplasty)  Goal: Effective Urinary Elimination  Outcome: Ongoing, Progressing     Problem: Respiratory Compromise (Knee Arthroplasty)  Goal: Effective Oxygenation and Ventilation  Outcome: Ongoing,  Progressing

## 2022-08-09 NOTE — PLAN OF CARE
Problem: Physical Therapy  Goal: Physical Therapy Goal  Description: Pt will improve functional independence by performing:    Bed mobility: SBA  MET 08/09/2022 Sit to stand: SBA  Car Transfer: Min A  with rolling walker  Ambulation x 200'  feet with SBA and rolling walker  MET 08/09/2022 1 Step (Curb): Min A  and rolling walker  MET 08/09/2022 3 Steps: Min A  and B HR  right knee AROM flexion (in degrees): 105  right knee AROM extension (in degrees): 0   Independent with total knee HEP      Outcome: Ongoing, Progressing

## 2022-08-09 NOTE — PT/OT/SLP PROGRESS
"Physical Therapy Treatment    Patient Name:  Moises San   MRN:  [id number]    Recommendations:     Discharge Recommendations:  outpatient PT   Discharge Equipment Recommendations: walker, rolling   Barriers to discharge: None    Assessment:     Moises San is a 72 y.o. male admitted with a medical diagnosis of Primary osteoarthritis of right knee.  He presents with the following impairments/functional limitations:  weakness, gait instability, decreased ROM, impaired endurance, impaired balance, decreased safety awareness, impaired muscle length, orthopedic precautions, pain, impaired functional mobility.    Rehab Prognosis: Good; patient would benefit from acute skilled PT services to address these deficits and reach maximum level of function.    Recent Surgery: Procedure(s) (LRB):  ROBOTIC ARTHROPLASTY, KNEE, TOTAL (Right) 1 Day Post-Op    Plan:     During this hospitalization, patient to be seen BID to address the identified rehab impairments via gait training, therapeutic activities, therapeutic exercises and progress toward the following goals:    · Plan of Care Expires:  08/15/22    Subjective     Chief Complaint:  Patient/Family Comments/goals:   Pain/Comfort:  · Pain Rating 1: 8/10  · Location - Side 1: Right  · Location 1: knee  · Pain Addressed 1: Pre-medicate for activity, Reposition  · Pain Rating Post-Intervention 1: 8/10      Objective:     Communicated with RN prior to session.  Patient found up in chair with peripheral IV upon PT entry to room.     General Precautions: Standard, fall   Orthopedic Precautions:RLE weight bearing as tolerated   Braces: N/A  Respiratory Status: Room air     Functional Mobility:  · Transfers:     · Sit to Stand:  contact guard assistance with rolling walker  · Gait: 250' with RW and SBA  · Stairs:  Pt ascended/descended 3 stair(s) and 4" curb step with Rolling Walker with bilateral handrails with Stand-by Assistance.     Therapeutic Activities and Exercises:    " Pt performed 1x10 of total knee HEP, handout provided    (In degrees) AROM PROM   R knee flexion 72 85   R knee extension 12 10         Patient left up in chair with all lines intact and call button in reach..    GOALS:   Multidisciplinary Problems     Physical Therapy Goals        Problem: Physical Therapy    Goal Priority Disciplines Outcome Goal Variances Interventions   Physical Therapy Goal     PT, PT/OT Ongoing, Progressing     Description: Pt will improve functional independence by performing:    Bed mobility: SBA  MET 08/09/2022 Sit to stand: SBA  Car Transfer: Min A  with rolling walker  Ambulation x 200'  feet with SBA and rolling walker  MET 08/09/2022 1 Step (Curb): Min A  and rolling walker  MET 08/09/2022 3 Steps: Min A  and B HR  right knee AROM flexion (in degrees): 105  right knee AROM extension (in degrees): 0   Independent with total knee HEP                       Time Tracking:     PT Received On:    PT Start Time: 0937     PT Stop Time: 1006  PT Total Time (min): 29 min     Billable Minutes: Gait Training 15 and Therapeutic Exercise 14    Treatment Type: Treatment  PT/PTA: PT           08/09/2022

## 2022-08-09 NOTE — PROGRESS NOTES
"No acute events overnight.  Pain controlled.  Resting in bed. CBGs elevated yesterday - monitoring    Vital Signs  Temp: 98.5 °F (36.9 °C)  Temp src: Oral  Pulse: 79  Heart Rate Source: Monitor  Resp: 16  SpO2: (!) 92 %  Pulse Oximetry Type: Continuous  Flow (L/min): 10  Oxygen Concentration (%): 80  O2 Device (Oxygen Therapy): room air  BP: 118/65  BP Location: Left arm  BP Method: Automatic  Patient Position: Lying  Height and Weight  Height: 5' 10" (177.8 cm)  Height Method: Stated  Weight: 105.2 kg (231 lb 14.8 oz)  Weight Method: Standard Scale  BSA (Calculated - sq m): 2.28 sq meters  BMI (Calculated): 33.3  Weight in (lb) to have BMI = 25: 173.9]    +FHL/EHL  BCR distally  Dressing c/d/i  SILT distally    Recent Lab Results  (Last 5 results in the past 24 hours)      08/09/22  0606   08/09/22  0542   08/08/22  2025   08/08/22  1605   08/08/22  1146        Anion Gap   11.0             BUN   10.8             BUN/CREAT RATIO   12             Calcium   8.3             Chloride   104             CO2   22             Creatinine   0.92             eGFR   >60             Glucose   198             Hematocrit         37.3       Hemoglobin         12.8       POCT Glucose 189     152   196         Potassium   4.6             Sodium   137                                  A/P:  Status post R TKA  Pain controlled  Overall patient doing well.  Therapy for mobility and ambulation.  ASA for DVT PPx  Monitor CBGs  Monitor PT progress  Home when safe and CBGs stable  "

## 2022-08-09 NOTE — DISCHARGE INSTRUCTIONS
[facility]  [address]  [place] [address]  Phone [phone]       /      Fax [phone]  SURGEON: Dr. Whalen    After discharge, all questions or concerns should be handled at your surgeon's office ([phone]). If it is a weekend or after hours, you will get the surgeon on call.     Discharge Medications:    PAIN MANAGEMENT: Next Dose Available               Robaxin/Methocarbamol 750mg (Muscle Relaxer) - Every 6-8 hours AS NEEDED for muscle spasms, thigh pain or additional pain control At 6pm if needed           Norco 10/325mg (Hydrocodone/Acetaminophen) (Pain Med) - every 4-6 hours AS NEEDED for pain At 1:30 pm               COMPLICATION PREVENTION MEDS: Next Dose DUE   Aspirin 81mg twice a day for 6 weeks post-op for blood clot prevention PM on 08/10/22   MiraLAX 17gm - once or twice a day while on narcotics and muscle relaxers for constipation prevention PM on 08/10/22       Increase Metformin to 1000mg twice a day for tighter blood sugar control to prevent infection.  PM on 8/10/22             Total Knee Replacement      PAIN MEDICATIONS/PAIN MANAGEMENT:  Norco 10/325mg (Hydrocodone/Acetaminophen) (pain pill) You can take 1 tablet every 4-6 hours for pain. If the pain is mild, take 1/2 of a pill. Once you start taking a half of a pain pill, you can take a Tylenol 325mg with each dose for a little extra pain relief without side effects. Gradually decrease the use as the pain lessens. As you decrease the Norco, increase Tylenol.    **NO MORE THAN 3000mg OF TYLENOL IN 24 HOURS**.     Robaxin/Methocarbamol 750mg (muscle relaxer)- you can take every 6-8 hours as needed for muscle spasms, thigh pain and stiffness, additional pain control or breakthrough pain medications. This medication is helpful for pain control while lessening your need for narcotics. Please reduce the use gradually as the pain and spasms lessen. DO NOT TAKE AT THE SAME TIME AS A PAIN PILL. YOU  WILL BE BETTER SERVED WITH 2 HOURS BETWEEN PAIN PILL AND MUSCLE RELAXER.     Use the medication log in your discharge packet to keep track of your medications.    **Other things that help with pain control is WALKING, COMPRESSION WRAP, ICE and ELEVATION!!**      BLOOD CLOT PREVENTION:   Aspirin 81 mg twice a day for 6 weeks postop. Start on 8/10/22. Stop on 9/22/22.  If you were taking a baby aspirin prior to surgery, okay to restart after 6 weeks - 9/23/22.  You need to continuing wearing your compression stocking (NOE Hose - ThromboEmbolic Disease Prevention Device) for the next 2-6 weeks post-op. It is ok to remove them for hygiene and at bedtime.   Hand wash and Dry. **If the swelling persists in the legs after you stop wearing the Noe hose, continue to wear them until the swelling decreases.**  REMOVE STOCKINGS AT LEAST DAILY FOR SKIN ASSESSMENT.   Do NOT let the stockings roll down, creating a tourniquet around the back of your knee. If you need to, leave the excess at the bottom of the stocking.   The best thing you can do to prevent blood clots is to walk around as much as possible, AT LEAST EVERY 1-2 HOURS.       CONSTIPATION PREVENTION:   Miralax or Senokot S/Kimberley-Colace and Stool softeners EVERY DAY while on pain meds.  Use other more aggressive over the counter LAXATIVES as needed for constipation (Examples: Milk of Magnesia, Dulcolax tabs or suppository, Magnesium Citrate, Fleet's Enema...etc.)   Drink lots of water.  Increase Fiber in diet.  Increase walking distance each day  DO NOT GO TOO LONG WITHOUT HAVING A BOWEL MOVEMENT!      ACTIVITY:  You will be FULL weight-bearing on your operative leg.  Please do not take yourself off of the walker too soon.  Please allow your outpatient therapist or surgeon to guide you.  You will be range of motion as tolerated to your operative knee.  Work on bending and straightening the knee and change positions often throughout the day.  Do not put anything behind the  knee, keeping it in a bent position.  Elevate your affected extremity way above the level of the heart to reduce swelling 2 to 3 times a day, 20-30 minutes at a time.  Walk around at least every 1-2 hours while awake.  No heavy lifting, pulling, pushing or straining.  Full Range of Motion to the Knee - working on bending and straightening     THERAPY  Outpatient Physical Therapy-bring your prescription to the clinic of your choice as soon as possible.    WOUND CARE:   Dry dressing changes every other day and as needed for soiling for 14 days. Start Friday  DO NOT WET WOUND or apply any ointments, creams, lotions or antiseptics.  Ace wrap - apply your compression stocking and apply the ace wrap where the stocking stops for extra added compression to the knee.   May wet incision after you follow-up with your surgeon.   Ok to shower before then if able to keep wound from getting wet (plastic barrier, saran wrap or cling wrap and tape).   DO NOT TOUCH INCISION  Apply Ice to the Knee and thigh as much as possible.      URINARY RETENTION:  If you start having difficulty urinating, decrease the use of Pain pills and muscle relaxers and notify your primary care doctor.     PNEUMONIA PREVENTION:  Stay out of bed as much as possible and walk around every 1-2 hours.  Continue breathing exercises (Incentive Spirometry) every 1-2 hours while mobility is limited and while you are on pain pills.    INFECTION PREVENTION:  Proper handwashing before and after dressing changes. Do not wet the wound. Wound care instructions as written above. NOTIFY MD OF EXCESSIVE WOUND DRAINAGE.  No alcohol, smoking or tobacco products  Pets should not be allowed around the wound or the dressing.   Treat UTI and skin infections as soon as possible.  Pre-medicate with antibiotics prior to dental or surgical procedures.   If you are diabetic, MAINTAIN GOOD BLOOD SUGAR CONTROL (Below 150) DURING YOUR RECOVERY. If you see high numbers, notify your primary  care doctor.     Call your SURGEON'S OFFICE ([phone]) if you experience the following signs and symptoms of infection:   Unusual redness, swelling, excessive, cloudy or foul smelling drainage at the incision site.   Persistent low grade temp OR a temp greater than 102 F, unrelieved by Tylenol  Pain at surgical site, unrelieved by pain meds    Warning signs of a blood clot in your leg: (CALL YOUR SURGEON)  New onset or increasing pain in calf, new onset tenderness or redness above or below the knee or increasing swelling of your calf, ankle, or foot.  Warning signs that a blood clot has traveled to your lungs: (REPORT TO THE ER/CALL 704)  Sudden or increase in Shortness of breath, sudden onset of chest pains, or  Localized chest pain with coughing.         IF ANY ISSUES ARISE AND YOU FEEL THE NEED TO CALL YOUR PRIMARY CARE DOCTOR, PLEASE LET YOUR SURGEON KNOW AS WELL.     For emergencies, please report to OUR ([facility]) Emergency department and tell them to call YOUR SURGEON at [phone].     BEFORE MAKING ANY CHANGES TO THE MEDICAL CARE PLAN OR GOING TO THE EMERGENCY ROOM, PLEASE CONTACT THE SURGEON.    3rd floor nursing unit # [phone]  Use this number for questions about your discharge instructions or problems filling your discharge prescriptions.

## 2022-08-09 NOTE — PT/OT/SLP PROGRESS
"Physical Therapy Treatment    Patient Name:  Moises San   MRN:  [id number]    Recommendations:     Discharge Recommendations:  outpatient PT, home   Discharge Equipment Recommendations: walker, rolling (New RW delivered at end of session)   Barriers to discharge: None    Assessment:     Moises San is a 72 y.o. male admitted with a medical diagnosis of Primary osteoarthritis of right knee.  He presents with the following impairments/functional limitations:  weakness, impaired endurance, impaired functional mobility, gait instability, decreased lower extremity function, pain, decreased safety awareness, decreased ROM, edema .    Rehab Prognosis: Good; patient would benefit from acute skilled PT services to address these deficits and reach maximum level of function.    Recent Surgery: Procedure(s) (LRB):  ROBOTIC ARTHROPLASTY, KNEE, TOTAL (Right) 1 Day Post-Op    Plan:     During this hospitalization, patient to be seen BID to address the identified rehab impairments via gait training, therapeutic activities, therapeutic exercises and progress toward the following goals:    · Plan of Care Expires:  08/15/22    Subjective     Chief Complaint: R thigh soreness/discomfort  Patient/Family Comments/goals: "To get my sugars down so I can go home tomorrow"  Pain/Comfort:  Location - Side 1: Right  Location 1: knee  Pain Addressed 1: Pre-medicate for activity, Reposition, Distraction, Cessation of Activity      Objective:     Communicated with RN prior to session.  Patient found supine with  call bell upon PT entry to room.     General Precautions: Standard, fall   Orthopedic Precautions:RLE weight bearing as tolerated   Braces: N/A  Respiratory Status: Room air     Functional Mobility:  · Bed Mobility:     · Supine to Sit: modified independence  · Transfers:     · Sit to Stand:  stand by assistance with rolling walker  · Bed to Chair: stand by assistance with  rolling walker  using  Step Transfer  · Car Transfer: " stand by assistance with  rolling walker  using  Step Transfer  · Gait: 150ft with RW, Mod I.       Therapeutic Activities and Exercises:   - Pt walking in/out of bathroom and stood to urinate. No LOB, Mod I.   - Pt performed TKA HEP x10 each exercise on RLE. Handout reviewed.     Patient left up in chair with call button in reach..    GOALS:   Multidisciplinary Problems     Physical Therapy Goals        Problem: Physical Therapy    Goal Priority Disciplines Outcome Goal Variances Interventions   Physical Therapy Goal     PT, PT/OT Ongoing, Progressing     Description: Pt will improve functional independence by performing:    Bed mobility: SBA  MET 08/09/2022 Sit to stand: SBA  MET 8/9/22 Car Transfer: Min A  with rolling walker  Ambulation x 200'  feet with SBA and rolling walker  MET 08/09/2022 1 Step (Curb): Min A  and rolling walker  MET 08/09/2022 3 Steps: Min A  and B HR  right knee AROM flexion (in degrees): 105  right knee AROM extension (in degrees): 0   Independent with total knee HEP                       Time Tracking:     PT Received On:    PT Start Time: 1311     PT Stop Time: 1338  PT Total Time (min): 27 min     Billable Minutes: Therapeutic Activity 15 and Therapeutic Exercise 12    Treatment Type: Treatment  PT/PTA: PT           08/09/2022

## 2022-08-09 NOTE — PLAN OF CARE
S/p TKR. Spk w pt -- pt lives alone; but plan to dc to son( Thaddeus)& daughter in law ( Radha) house. Son & dnl to asst w homecare. Pt needs RW. Provider list given. Foc obtained.   Called/ faxed referral for dme to [facility]. They will deliver to hospital.   Called/ faxed referral for outpatient therapy to [facility]. They will contact pt with appointment date & time.   PCP: Dr Donaldson  Rx: [facility] ( [place]) or ( [place]).     Contact # Thaddeus [phone]; Raquel [phone].

## 2022-08-10 VITALS
HEIGHT: 70 IN | SYSTOLIC BLOOD PRESSURE: 153 MMHG | BODY MASS INDEX: 33.21 KG/M2 | HEART RATE: 76 BPM | TEMPERATURE: 98 F | OXYGEN SATURATION: 96 % | DIASTOLIC BLOOD PRESSURE: 67 MMHG | RESPIRATION RATE: 20 BRPM | WEIGHT: 231.94 LBS

## 2022-08-10 LAB
ANION GAP SERPL CALC-SCNC: 9 MEQ/L
BUN SERPL-MCNC: 11.2 MG/DL (ref 8.4–25.7)
CALCIUM SERPL-MCNC: 8.4 MG/DL (ref 8.8–10)
CHLORIDE SERPL-SCNC: 103 MMOL/L (ref 98–107)
CO2 SERPL-SCNC: 26 MMOL/L (ref 23–31)
CREAT SERPL-MCNC: 1.02 MG/DL (ref 0.73–1.18)
CREAT/UREA NIT SERPL: 11
ERYTHROCYTE [DISTWIDTH] IN BLOOD BY AUTOMATED COUNT: 12 % (ref 11.5–17)
GFR SERPLBLD CREATININE-BSD FMLA CKD-EPI: >60 MLS/MIN/1.73/M2
GLUCOSE SERPL-MCNC: 153 MG/DL (ref 82–115)
HCT VFR BLD AUTO: 34.8 % (ref 42–52)
HGB BLD-MCNC: 11.9 GM/DL (ref 14–18)
MCH RBC QN AUTO: 30.3 PG (ref 27–31)
MCHC RBC AUTO-ENTMCNC: 34.2 MG/DL (ref 33–36)
MCV RBC AUTO: 88.5 FL (ref 80–94)
NRBC BLD AUTO-RTO: 0 %
PLATELET # BLD AUTO: 170 X10(3)/MCL (ref 130–400)
PMV BLD AUTO: 9.4 FL (ref 7.4–10.4)
POCT GLUCOSE: 142 MG/DL (ref 70–110)
POTASSIUM SERPL-SCNC: 4.6 MMOL/L (ref 3.5–5.1)
RBC # BLD AUTO: 3.93 X10(6)/MCL (ref 4.7–6.1)
SODIUM SERPL-SCNC: 138 MMOL/L (ref 136–145)
WBC # SPEC AUTO: 6.9 X10(3)/MCL (ref 4.5–11.5)

## 2022-08-10 PROCEDURE — 36415 COLL VENOUS BLD VENIPUNCTURE: CPT | Performed by: ORTHOPAEDIC SURGERY

## 2022-08-10 PROCEDURE — 94799 UNLISTED PULMONARY SVC/PX: CPT

## 2022-08-10 PROCEDURE — 96375 TX/PRO/DX INJ NEW DRUG ADDON: CPT | Performed by: ORTHOPAEDIC SURGERY

## 2022-08-10 PROCEDURE — G0378 HOSPITAL OBSERVATION PER HR: HCPCS

## 2022-08-10 PROCEDURE — 80048 BASIC METABOLIC PNL TOTAL CA: CPT | Performed by: ORTHOPAEDIC SURGERY

## 2022-08-10 PROCEDURE — 63600175 PHARM REV CODE 636 W HCPCS: Performed by: ORTHOPAEDIC SURGERY

## 2022-08-10 PROCEDURE — 94761 N-INVAS EAR/PLS OXIMETRY MLT: CPT

## 2022-08-10 PROCEDURE — 25000003 PHARM REV CODE 250: Performed by: NURSE PRACTITIONER

## 2022-08-10 PROCEDURE — 97110 THERAPEUTIC EXERCISES: CPT

## 2022-08-10 PROCEDURE — 97116 GAIT TRAINING THERAPY: CPT

## 2022-08-10 PROCEDURE — 85027 COMPLETE CBC AUTOMATED: CPT | Performed by: ORTHOPAEDIC SURGERY

## 2022-08-10 PROCEDURE — 25000003 PHARM REV CODE 250: Performed by: ORTHOPAEDIC SURGERY

## 2022-08-10 PROCEDURE — 63600175 PHARM REV CODE 636 W HCPCS: Performed by: NURSE PRACTITIONER

## 2022-08-10 PROCEDURE — 96372 THER/PROPH/DIAG INJ SC/IM: CPT | Performed by: ORTHOPAEDIC SURGERY

## 2022-08-10 RX ORDER — HYDROCODONE BITARTRATE AND ACETAMINOPHEN 10; 325 MG/1; MG/1
1 TABLET ORAL EVERY 4 HOURS PRN
Qty: 42 TABLET | Refills: 0 | Status: SHIPPED | OUTPATIENT
Start: 2022-08-10 | End: 2022-08-18 | Stop reason: SDUPTHER

## 2022-08-10 RX ORDER — METFORMIN HYDROCHLORIDE 1000 MG/1
1000 TABLET ORAL 2 TIMES DAILY WITH MEALS
Qty: 180 TABLET | Refills: 3 | Status: SHIPPED | OUTPATIENT
Start: 2022-08-10 | End: 2023-08-10

## 2022-08-10 RX ORDER — HYDROCODONE BITARTRATE AND ACETAMINOPHEN 7.5; 325 MG/1; MG/1
1 TABLET ORAL EVERY 4 HOURS PRN
Status: DISCONTINUED | OUTPATIENT
Start: 2022-08-10 | End: 2022-08-10 | Stop reason: HOSPADM

## 2022-08-10 RX ORDER — HYDROCODONE BITARTRATE AND ACETAMINOPHEN 10; 325 MG/1; MG/1
1 TABLET ORAL EVERY 4 HOURS PRN
Status: DISCONTINUED | OUTPATIENT
Start: 2022-08-10 | End: 2022-08-10 | Stop reason: HOSPADM

## 2022-08-10 RX ORDER — KETOROLAC TROMETHAMINE 30 MG/ML
30 INJECTION, SOLUTION INTRAMUSCULAR; INTRAVENOUS ONCE
Status: COMPLETED | OUTPATIENT
Start: 2022-08-10 | End: 2022-08-10

## 2022-08-10 RX ADMIN — AMLODIPINE BESYLATE 10 MG: 5 TABLET ORAL at 07:08

## 2022-08-10 RX ADMIN — ASPIRIN 81 MG CHEWABLE TABLET 81 MG: 81 TABLET CHEWABLE at 07:08

## 2022-08-10 RX ADMIN — VALSARTAN 160 MG: 160 TABLET, FILM COATED ORAL at 07:08

## 2022-08-10 RX ADMIN — DOCUSATE SODIUM 200 MG: 100 CAPSULE, LIQUID FILLED ORAL at 07:08

## 2022-08-10 RX ADMIN — FAMOTIDINE 20 MG: 20 TABLET ORAL at 07:08

## 2022-08-10 RX ADMIN — HYDROCODONE BITARTRATE AND ACETAMINOPHEN 1 TABLET: 7.5; 325 TABLET ORAL at 09:08

## 2022-08-10 RX ADMIN — HYDROCODONE BITARTRATE AND ACETAMINOPHEN 1 TABLET: 5; 325 TABLET ORAL at 06:08

## 2022-08-10 RX ADMIN — ACETAMINOPHEN 500 MG: 500 TABLET ORAL at 02:08

## 2022-08-10 RX ADMIN — GLIMEPIRIDE 4 MG: 4 TABLET ORAL at 07:08

## 2022-08-10 RX ADMIN — METHOCARBAMOL 750 MG: 750 TABLET ORAL at 02:08

## 2022-08-10 RX ADMIN — METHOCARBAMOL 750 MG: 750 TABLET ORAL at 11:08

## 2022-08-10 RX ADMIN — HYDROCODONE BITARTRATE AND ACETAMINOPHEN 1 TABLET: 5; 325 TABLET ORAL at 02:08

## 2022-08-10 RX ADMIN — METFORMIN HYDROCHLORIDE 1000 MG: 500 TABLET ORAL at 07:08

## 2022-08-10 RX ADMIN — INSULIN ASPART 2 UNITS: 100 INJECTION, SOLUTION INTRAVENOUS; SUBCUTANEOUS at 11:08

## 2022-08-10 RX ADMIN — SENNOSIDES AND DOCUSATE SODIUM 2 TABLET: 50; 8.6 TABLET ORAL at 07:08

## 2022-08-10 RX ADMIN — KETOROLAC TROMETHAMINE 30 MG: 30 INJECTION, SOLUTION INTRAMUSCULAR at 09:08

## 2022-08-10 RX ADMIN — CARVEDILOL 6.25 MG: 6.25 TABLET, FILM COATED ORAL at 07:08

## 2022-08-10 NOTE — PT/OT/SLP PROGRESS
Physical Therapy Treatment    Patient Name:  Moises San   MRN:  [id number]    Recommendations:     Discharge Recommendations:  outpatient PT, home   Discharge Equipment Recommendations: walker, rolling   Barriers to discharge: None    Assessment:     Moises San is a 72 y.o. male admitted with a medical diagnosis of Primary osteoarthritis of right knee.  He presents with the following impairments/functional limitations:  weakness, gait instability, decreased ROM, impaired endurance, impaired balance, decreased safety awareness, impaired muscle length, orthopedic precautions, pain, impaired functional mobility.    Rehab Prognosis: Good; patient would benefit from acute skilled PT services to address these deficits and reach maximum level of function.    Recent Surgery: Procedure(s) (LRB):  ROBOTIC ARTHROPLASTY, KNEE, TOTAL (Right) 2 Days Post-Op    Plan:     During this hospitalization, patient to be seen BID to address the identified rehab impairments via gait training, therapeutic activities, therapeutic exercises and progress toward the following goals:    · Plan of Care Expires:  08/15/22    Subjective     Chief Complaint:   Patient/Family Comments/goals:   Pain/Comfort:  · Pain Rating 1: 8/10  · Location - Side 1: Right  · Location 1: knee  · Pain Addressed 1: Pre-medicate for activity, Reposition  · Pain Rating Post-Intervention 1: 6/10      Objective:     Communicated with RN prior to session.  Patient found supine with peripheral IV upon PT entry to room.     General Precautions: Standard, fall   Orthopedic Precautions:RLE weight bearing as tolerated   Braces: N/A  Respiratory Status: Room air     Functional Mobility:  · Bed Mobility:     · Scooting: modified independence  · Supine to Sit: modified independence  · Transfers:     · Sit to Stand:  stand by assistance with rolling walker  · Gait: 250' with RW and SBA        Therapeutic Activities and Exercises:    Pt performed 1x10 of total knee HEP,  handout provided    (In degrees) AROM PROM   R knee flexion 71 76   R knee extension 12 9        Patient left up in chair with all lines intact, call button in reach and RN notified..    GOALS:   Multidisciplinary Problems     Physical Therapy Goals        Problem: Physical Therapy    Goal Priority Disciplines Outcome Goal Variances Interventions   Physical Therapy Goal     PT, PT/OT Ongoing, Progressing     Description: Pt will improve functional independence by performing:    Bed mobility: SBA  MET 08/09/2022 Sit to stand: SBA  MET 8/9/22 Car Transfer: Min A  with rolling walker  MET 08/10/2022 Ambulation x 200'  feet with SBA and rolling walker  MET 08/09/2022 1 Step (Curb): Min A  and rolling walker  MET 08/09/2022 3 Steps: Min A  and B HR  right knee AROM flexion (in degrees): 105  right knee AROM extension (in degrees): 0   Independent with total knee HEP                       Time Tracking:     PT Received On:    PT Start Time: 1038     PT Stop Time: 1101  PT Total Time (min): 23 min     Billable Minutes: Gait Training 13 and Therapeutic Exercise 10    Treatment Type: Treatment  PT/PTA: PT           Pt tolerated session well, educated on safety awareness, proper hydration, HEP, mobility, and pain management strategies to reduce risk of medical complications upon d/c home. All questions/concerns addressed. This note to serve as d/c summary.      08/10/2022

## 2022-08-10 NOTE — PLAN OF CARE
Problem: Physical Therapy  Goal: Physical Therapy Goal  Description: Pt will improve functional independence by performing:    Bed mobility: SBA  MET 08/09/2022 Sit to stand: SBA  MET 8/9/22 Car Transfer: Min A  with rolling walker  MET 08/10/2022 Ambulation x 200'  feet with SBA and rolling walker  MET 08/09/2022 1 Step (Curb): Min A  and rolling walker  MET 08/09/2022 3 Steps: Min A  and B HR  right knee AROM flexion (in degrees): 105  right knee AROM extension (in degrees): 0   Independent with total knee HEP      Outcome: Ongoing, Progressing

## 2022-08-10 NOTE — NURSING
Discharge teaching done with pt and daughter in law..both voiced understanding of teaching...No distress observed.  Pt given dressing supplies.  Walker for home use at bedside

## 2022-08-10 NOTE — PLAN OF CARE
Problem: Adult Inpatient Plan of Care  Goal: Plan of Care Review  Outcome: Met  Goal: Patient-Specific Goal (Individualized)  Outcome: Met  Goal: Absence of Hospital-Acquired Illness or Injury  Outcome: Met  Goal: Optimal Comfort and Wellbeing  Outcome: Met  Goal: Readiness for Transition of Care  Outcome: Met     Problem: Infection  Goal: Absence of Infection Signs and Symptoms  Outcome: Met     Problem: Adjustment to Surgery (Knee Arthroplasty)  Goal: Optimal Coping  Outcome: Met     Problem: Bowel Motility Impaired (Knee Arthroplasty)  Goal: Effective Bowel Elimination  Outcome: Met     Problem: Fluid and Electrolyte Imbalance (Knee Arthroplasty)  Goal: Fluid and Electrolyte Balance  Outcome: Met     Problem: Functional Ability Impaired (Knee Arthroplasty)  Goal: Optimal Functional Ability  Outcome: Met     Problem: Infection (Knee Arthroplasty)  Goal: Absence of Infection Signs and Symptoms  Outcome: Met     Problem: Neurovascular Compromise (Knee Arthroplasty)  Goal: Intact Neurovascular Status  Outcome: Met     Problem: Pain (Knee Arthroplasty)  Goal: Acceptable Pain Control  Outcome: Met     Problem: Postoperative Nausea and Vomiting (Knee Arthroplasty)  Goal: Nausea and Vomiting Relief  Outcome: Met     Problem: Postoperative Urinary Retention (Knee Arthroplasty)  Goal: Effective Urinary Elimination  Outcome: Met     Problem: Respiratory Compromise (Knee Arthroplasty)  Goal: Effective Oxygenation and Ventilation  Outcome: Met

## 2022-08-10 NOTE — PROGRESS NOTES
"No acute events overnight.  Pain controlled.  Resting in bed. Pain controlled this AM.     Vital Signs  Temp: 98 °F (36.7 °C)  Temp src: Oral  Pulse: 76  Heart Rate Source: Monitor  Resp: 20  SpO2: 96 %  Pulse Oximetry Type: Intermittent  Flow (L/min): 10  Oxygen Concentration (%): 80  O2 Device (Oxygen Therapy): room air  BP: (!) 153/67  BP Location: Left arm  BP Method: Automatic  Patient Position: Lying  Height and Weight  Height: 5' 10" (177.8 cm)  Height Method: Stated  Weight: 105.2 kg (231 lb 14.8 oz)  Weight Method: Standard Scale  BSA (Calculated - sq m): 2.28 sq meters  BMI (Calculated): 33.3  Weight in (lb) to have BMI = 25: 173.9]    +FHL/EHL  BCR distally  Dressing c/d/i  SILT distally    Recent Lab Results  (Last 5 results in the past 24 hours)      08/10/22  0616   08/10/22  0614   08/10/22  0556   08/09/22  2141   08/09/22  1603        Anion Gap   9.0             BUN   11.2             BUN/CREAT RATIO   11             Calcium   8.4             Chloride   103             CO2   26             Creatinine   1.02             eGFR   >60             Glucose   153             Hematocrit     34.8           Hemoglobin     11.9           MCH     30.3           MCHC     34.2           MCV     88.5           MPV     9.4           nRBC     0.0           Platelets     170           POCT Glucose 142       167   150       Potassium   4.6             RBC     3.93           RDW     12.0           Sodium   138             WBC     6.9                                A/P:  Status post R TKA  Pain controlled  Overall patient doing well.  Therapy for mobility and ambulation.  ASA for DVT PPx  Home today - CBGs much improved  "

## 2022-08-11 LAB
POCT GLUCOSE: 177 MG/DL (ref 70–110)
VIEW PATHOLOGY REPORT (RELIAPATH): NORMAL

## 2022-08-16 NOTE — DISCHARGE SUMMARY
[facility]  [facility]charge Summary      Admit Date: 8/8/2022    Discharge Date and Time: 8/10/2022 12:45 PM    Attending Physician: Gracy att. providers found     Reason for Admission: primary osteoarthritis right knee    Procedures Performed: Procedure(s) (LRB):  ROBOTIC ARTHROPLASTY, KNEE, TOTAL (Right)    Hospital Course Patient seen in clinic with complaints of right knee pain. Tried and failed all conservative measures including activity modification, anti-inflammatories, and injections. Patient felt as though they have reached a point of disability with regards to right knee pain. Risk, benefits, and alternatives discussed for right total knee arthroplasty. Despite these risks, the patient wished to proceed with surgical intervention.    Patient admitted as an inpatient. Seen preoperatively by anesthesia and cleared for surgery. Patient was then taken to the OR and a right total knee arthroplasty was performed. For full details please see dictated operative note. Patient tolerated the procedure without any issues and was transferred to the floor postoperatively. Physical therapy began working with the patient on postop day 1 and patient was made weightbearing as tolerated to affected extremity. Patient progressed well with physical therapy and eventually cleared all physical therapy guidelines. Patient's pain and vitals remained stable throughout hospitalization. Wound was checked and found to be clean, dry, and intact. At this point the patient was deemed suitable to discharge home.    Goals of Care Treatment Preferences:  Code Status: Full Code      Consults: PT    Significant Diagnostic Studies:   Specimen (24h ago, onward)            None          Final Diagnoses:    Principal Problem: Primary osteoarthritis of right knee   Secondary Diagnoses:   Active Hospital Problems    Diagnosis  POA    *Primary osteoarthritis of right knee [M17.11]  Yes      Resolved Hospital Problems   No  resolved problems to display.       Discharged Condition: good    Disposition: Home or Self Care    Follow Up/Patient Instructions:     Medications:  Reconciled Home Medications:      Medication List      START taking these medications    HYDROcodone-acetaminophen  mg per tablet  Commonly known as: NORCO  Take 1 tablet by mouth every 4 (four) hours as needed for Pain.     * metFORMIN 500 MG tablet  Commonly known as: GLUCOPHAGE  Take 2 tablets (1,000 mg total) by mouth 2 (two) times daily with meals.     * metFORMIN 1000 MG tablet  Commonly known as: GLUCOPHAGE  Take 1 tablet (1,000 mg total) by mouth 2 (two) times daily with meals.     methocarbamoL 750 MG Tab  Commonly known as: ROBAXIN  Take 1 tablet (750 mg total) by mouth every 6 (six) hours. for 14 days     polyethylene glycol 17 gram Pwpk  Commonly known as: GLYCOLAX  Take 17 g by mouth once daily. for 14 days         * This list has 2 medication(s) that are the same as other medications prescribed for you. Read the directions carefully, and ask your doctor or other care provider to review them with you.            CONTINUE taking these medications    amLODIPine 10 MG tablet  Commonly known as: NORVASC  Take 10 mg by mouth once daily.     atorvastatin 40 MG tablet  Commonly known as: LIPITOR  TAKE 1 TABLET BY MOUTH 1 TIME IN THE EVENING     carvediloL 6.25 MG tablet  Commonly known as: COREG  Take 6.25 mg by mouth 2 (two) times daily.     cetirizine 10 MG tablet  Commonly known as: ZYRTEC  Take 10 mg by mouth once daily.     fluticasone propionate 50 mcg/actuation nasal spray  Commonly known as: FLONASE  1 spray by Each Nostril route once daily.     glimepiride 4 MG tablet  Commonly known as: AMARYL  Take 4 mg by mouth 2 (two) times daily.     lansoprazole 15 MG capsule  Commonly known as: PREVACID  Take 15 mg by mouth once daily.     valsartan 160 MG tablet  Commonly known as: DIOVAN  Take 160 mg by mouth once daily.        STOP taking these  medications    aspirin 81 MG EC tablet  Commonly known as: ECOTRIN     naproxen sodium 220 MG tablet  Commonly known as: ANAPROX          No discharge procedures on file.   Follow-up Information     Derek Whalen MD Follow up.    Specialty: Orthopedic Surgery  Why: Ortho follow up appt. on Tuesday August 30th at 8:15 am with Dr. Whalen.  Contact information:  [address]  [address]  [facility] [address]  [phone]             [facility] Follow up.    Why: This is the equipment company., Call if you have questions or concerns.  Contact information:  [address] [place], [place] [address] [address]  [phone]             [facility] Follow up.    Why: This is the outpatient therapy facility.They will contact pt with appt date & time. Call if you have questions or concerns.  Contact information:  [address]. [facility] [address]  [phone]           Sergey Smith MD. Go on 8/12/2022.    Specialty: Family Medicine  Why: PCP follow up appt on Friday 8/12/22 @ 1:30pm. Reason for follow up: hyperglycemia.  Contact information:  [address] [facility] [address]  [phone]

## 2022-08-18 DIAGNOSIS — Z47.1 AFTERCARE FOLLOWING JOINT REPLACEMENT SURGERY, UNSPECIFIED JOINT: Primary | ICD-10-CM

## 2022-08-18 RX ORDER — HYDROCODONE BITARTRATE AND ACETAMINOPHEN 10; 325 MG/1; MG/1
1 TABLET ORAL EVERY 4 HOURS PRN
Qty: 42 TABLET | Refills: 0 | Status: SHIPPED | OUTPATIENT
Start: 2022-08-18 | End: 2022-08-24

## 2022-08-18 RX ORDER — HYDROCODONE BITARTRATE AND ACETAMINOPHEN 10; 325 MG/1; MG/1
1 TABLET ORAL EVERY 6 HOURS PRN
Qty: 28 TABLET | Refills: 0 | Status: SHIPPED | OUTPATIENT
Start: 2022-08-18 | End: 2022-08-24

## 2022-08-24 DIAGNOSIS — Z47.1 AFTERCARE FOLLOWING JOINT REPLACEMENT SURGERY, UNSPECIFIED JOINT: ICD-10-CM

## 2022-08-25 RX ORDER — HYDROCODONE BITARTRATE AND ACETAMINOPHEN 10; 325 MG/1; MG/1
1 TABLET ORAL EVERY 6 HOURS PRN
Qty: 28 TABLET | Refills: 0 | Status: SHIPPED | OUTPATIENT
Start: 2022-08-25 | End: 2022-08-31 | Stop reason: SDUPTHER

## 2022-08-30 ENCOUNTER — OFFICE VISIT (OUTPATIENT)
Dept: ORTHOPEDICS | Facility: CLINIC | Age: 72
End: 2022-08-30
Payer: MEDICARE

## 2022-08-30 ENCOUNTER — HOSPITAL ENCOUNTER (OUTPATIENT)
Dept: RADIOLOGY | Facility: CLINIC | Age: 72
Discharge: HOME | End: 2022-08-30
Attending: ORTHOPAEDIC SURGERY
Payer: MEDICARE

## 2022-08-30 VITALS
HEART RATE: 71 BPM | TEMPERATURE: 97 F | WEIGHT: 231 LBS | HEIGHT: 70 IN | SYSTOLIC BLOOD PRESSURE: 135 MMHG | BODY MASS INDEX: 33.07 KG/M2 | DIASTOLIC BLOOD PRESSURE: 67 MMHG

## 2022-08-30 DIAGNOSIS — Z47.1 AFTERCARE FOLLOWING JOINT REPLACEMENT SURGERY, UNSPECIFIED JOINT: ICD-10-CM

## 2022-08-30 DIAGNOSIS — Z47.1 AFTERCARE FOLLOWING JOINT REPLACEMENT SURGERY, UNSPECIFIED JOINT: Primary | ICD-10-CM

## 2022-08-30 PROCEDURE — 3288F FALL RISK ASSESSMENT DOCD: CPT | Mod: CPTII,,, | Performed by: ORTHOPAEDIC SURGERY

## 2022-08-30 PROCEDURE — 1159F PR MEDICATION LIST DOCUMENTED IN MEDICAL RECORD: ICD-10-PCS | Mod: CPTII,,, | Performed by: ORTHOPAEDIC SURGERY

## 2022-08-30 PROCEDURE — 73562 X-RAY EXAM OF KNEE 3: CPT | Mod: RT,,, | Performed by: ORTHOPAEDIC SURGERY

## 2022-08-30 PROCEDURE — 3078F PR MOST RECENT DIASTOLIC BLOOD PRESSURE < 80 MM HG: ICD-10-PCS | Mod: CPTII,,, | Performed by: ORTHOPAEDIC SURGERY

## 2022-08-30 PROCEDURE — 4010F ACE/ARB THERAPY RXD/TAKEN: CPT | Mod: CPTII,,, | Performed by: ORTHOPAEDIC SURGERY

## 2022-08-30 PROCEDURE — 1101F PT FALLS ASSESS-DOCD LE1/YR: CPT | Mod: CPTII,,, | Performed by: ORTHOPAEDIC SURGERY

## 2022-08-30 PROCEDURE — 99024 POSTOP FOLLOW-UP VISIT: CPT | Mod: ,,, | Performed by: ORTHOPAEDIC SURGERY

## 2022-08-30 PROCEDURE — 1160F PR REVIEW ALL MEDS BY PRESCRIBER/CLIN PHARMACIST DOCUMENTED: ICD-10-PCS | Mod: CPTII,,, | Performed by: ORTHOPAEDIC SURGERY

## 2022-08-30 PROCEDURE — 1160F RVW MEDS BY RX/DR IN RCRD: CPT | Mod: CPTII,,, | Performed by: ORTHOPAEDIC SURGERY

## 2022-08-30 PROCEDURE — 3008F PR BODY MASS INDEX (BMI) DOCUMENTED: ICD-10-PCS | Mod: CPTII,,, | Performed by: ORTHOPAEDIC SURGERY

## 2022-08-30 PROCEDURE — 73562 XR KNEE 3 VIEW RIGHT: ICD-10-PCS | Mod: RT,,, | Performed by: ORTHOPAEDIC SURGERY

## 2022-08-30 PROCEDURE — 1125F AMNT PAIN NOTED PAIN PRSNT: CPT | Mod: CPTII,,, | Performed by: ORTHOPAEDIC SURGERY

## 2022-08-30 PROCEDURE — 3075F SYST BP GE 130 - 139MM HG: CPT | Mod: CPTII,,, | Performed by: ORTHOPAEDIC SURGERY

## 2022-08-30 PROCEDURE — 3288F PR FALLS RISK ASSESSMENT DOCUMENTED: ICD-10-PCS | Mod: CPTII,,, | Performed by: ORTHOPAEDIC SURGERY

## 2022-08-30 PROCEDURE — 1159F MED LIST DOCD IN RCRD: CPT | Mod: CPTII,,, | Performed by: ORTHOPAEDIC SURGERY

## 2022-08-30 PROCEDURE — 99024 PR POST-OP FOLLOW-UP VISIT: ICD-10-PCS | Mod: ,,, | Performed by: ORTHOPAEDIC SURGERY

## 2022-08-30 PROCEDURE — 3075F PR MOST RECENT SYSTOLIC BLOOD PRESS GE 130-139MM HG: ICD-10-PCS | Mod: CPTII,,, | Performed by: ORTHOPAEDIC SURGERY

## 2022-08-30 PROCEDURE — 4010F PR ACE/ARB THEARPY RXD/TAKEN: ICD-10-PCS | Mod: CPTII,,, | Performed by: ORTHOPAEDIC SURGERY

## 2022-08-30 PROCEDURE — 1101F PR PT FALLS ASSESS DOC 0-1 FALLS W/OUT INJ PAST YR: ICD-10-PCS | Mod: CPTII,,, | Performed by: ORTHOPAEDIC SURGERY

## 2022-08-30 PROCEDURE — 1125F PR PAIN SEVERITY QUANTIFIED, PAIN PRESENT: ICD-10-PCS | Mod: CPTII,,, | Performed by: ORTHOPAEDIC SURGERY

## 2022-08-30 PROCEDURE — 3078F DIAST BP <80 MM HG: CPT | Mod: CPTII,,, | Performed by: ORTHOPAEDIC SURGERY

## 2022-08-30 PROCEDURE — 3008F BODY MASS INDEX DOCD: CPT | Mod: CPTII,,, | Performed by: ORTHOPAEDIC SURGERY

## 2022-08-30 NOTE — PROGRESS NOTES
Past Medical History:   Diagnosis Date    Diabetes mellitus, type 2     High cholesterol     Hypertension     Osteoarthritis     PAD (peripheral artery disease)        Past Surgical History:   Procedure Laterality Date    appendix removal      BACK SURGERY      peripheral stents Left     ROBOTIC ARTHROPLASTY, KNEE Right 2022    Procedure: ROBOTIC ARTHROPLASTY, KNEE, TOTAL;  Surgeon: Derek Whalen MD;  Location: [facility];  Service: Orthopedics;  Laterality: Right;       Current Outpatient Medications   Medication Sig    amLODIPine (NORVASC) 10 MG tablet Take 10 mg by mouth once daily.    atorvastatin (LIPITOR) 40 MG tablet TAKE 1 TABLET BY MOUTH 1 TIME IN THE EVENING    carvediloL (COREG) 6.25 MG tablet Take 6.25 mg by mouth 2 (two) times daily.    cetirizine (ZYRTEC) 10 MG tablet Take 10 mg by mouth once daily.    fluticasone propionate (FLONASE) 50 mcg/actuation nasal spray 1 spray by Each Nostril route once daily.    glimepiride (AMARYL) 4 MG tablet Take 4 mg by mouth 2 (two) times daily.    HYDROcodone-acetaminophen (NORCO)  mg per tablet Take 1 tablet by mouth every 6 (six) hours as needed for Pain.    lansoprazole (PREVACID) 15 MG capsule Take 15 mg by mouth once daily.    metFORMIN (GLUCOPHAGE) 1000 MG tablet Take 1 tablet (1,000 mg total) by mouth 2 (two) times daily with meals.    metFORMIN (GLUCOPHAGE) 500 MG tablet Take 2 tablets (1,000 mg total) by mouth 2 (two) times daily with meals.    valsartan (DIOVAN) 160 MG tablet Take 160 mg by mouth once daily.     No current facility-administered medications for this visit.       Review of patient's allergies indicates:  No Known Allergies    Family History   Family history unknown: Yes       Social History     Socioeconomic History    Marital status:    Tobacco Use    Smoking status: Former     Types: Cigarettes     Quit date:      Years since quittin.6    Smokeless tobacco: Never   Substance and Sexual Activity    Alcohol use: Not  Currently    Drug use: Never       Chief Complaint:   Chief Complaint   Patient presents with    Right Knee - Post-op Evaluation    Knee Pain     2 week s/p right total knee replacement on 8/8/22.  Walking well with a cane.       History of present illness: Moises San is a 72 y.o. male, presents to the clinic today 2 weeks status post right total knee arthroplasty.  Overall the patient is doing well.  Ambulating with a cane at this time.  Currently in physical therapy for strength training, range of motion, mobility.  Pain is tolerable with Norco 10 mg.  Incision site no complaints of swelling, drainage, in our bleeding.  Dressing is clean dry and intact today here in clinic.      Review of Systems:    Denies fevers, chills, chest pain, shortness of breath. Comprehensive review of systems performed and otherwise negative except as noted in HPI      Physical Examination:    General: awake and alert, no acute distress, healthy appearing  Head and Neck: Head atraumatic/normocephalic. Moist MM  CV: brisk cap refill  Lungs: non-labored breathing, w/o cough or SOB  Skin: no rashes present, warm to touch  Neuro: sensation grossly intact distall     Vital Signs:    Vitals:    08/30/22 0835   BP: 135/67   Pulse: 71   Temp: 97.3 °F (36.3 °C)       Body mass index is 33.15 kg/m².    Examination right knee:    Incision clean dry and intact. No erythema or drainage or signs of infection.  Sensation intact distally to right foot  Positive FHL/EHL/gastrocsoleus/tib ant  Brisk capillary refill to right foot  No swelling or signs of DVT  Range of motion: extension at 5 and flexion at 90  Stable to varus valgus  Stable to anterior and posterior drawer    X-rays: 3 views of the right knee reviewed. Patient's implants appear well fixed. No signs of loosening or subsidence noted.     Assessment::post op status post R TKA    Plan:  Patient presents to clinic today 2 weeks status post right total knee arthroplasty.  Overall the  patient is doing very well.  His knee is stable on examination x-rays today in clinic.  Staples are removed incision site is well intact.  Educated that he can now shower without vigorously scrubbing the incision site.  Patient is to continue physical therapy for strengthening, range of motion, mobility. Follow-up in 1 month for repeat x-rays and evaluation. All questions and concerns were addressed. The patient understands and agrees with the plan of care.    The above findings, diagnostics, and treatment plan were discussed with Dr. Derek Whalen who is in agreement with the plan of care.    This note was created using [employer] voice recognition software that occasionally misinterpreted phrases or words.    Consult note is delivered via Epic messaging service.

## 2022-08-31 DIAGNOSIS — Z47.1 AFTERCARE FOLLOWING JOINT REPLACEMENT SURGERY, UNSPECIFIED JOINT: ICD-10-CM

## 2022-09-01 RX ORDER — HYDROCODONE BITARTRATE AND ACETAMINOPHEN 10; 325 MG/1; MG/1
1 TABLET ORAL EVERY 6 HOURS PRN
Qty: 28 TABLET | Refills: 0 | Status: SHIPPED | OUTPATIENT
Start: 2022-09-01 | End: 2022-09-08

## 2022-09-08 DIAGNOSIS — Z47.1 AFTERCARE FOLLOWING JOINT REPLACEMENT SURGERY, UNSPECIFIED JOINT: ICD-10-CM

## 2022-09-09 RX ORDER — HYDROCODONE BITARTRATE AND ACETAMINOPHEN 7.5; 325 MG/1; MG/1
1 TABLET ORAL EVERY 6 HOURS PRN
Qty: 28 TABLET | Refills: 0 | Status: SHIPPED | OUTPATIENT
Start: 2022-09-09 | End: 2022-09-16

## 2022-09-21 DIAGNOSIS — Z47.1 AFTERCARE FOLLOWING JOINT REPLACEMENT SURGERY, UNSPECIFIED JOINT: Primary | ICD-10-CM

## 2022-09-22 RX ORDER — HYDROCODONE BITARTRATE AND ACETAMINOPHEN 7.5; 325 MG/1; MG/1
1 TABLET ORAL EVERY 6 HOURS PRN
Qty: 28 TABLET | Refills: 0 | Status: SHIPPED | OUTPATIENT
Start: 2022-09-22 | End: 2022-09-29

## 2022-09-27 ENCOUNTER — OFFICE VISIT (OUTPATIENT)
Dept: ORTHOPEDICS | Facility: CLINIC | Age: 72
End: 2022-09-27
Payer: MEDICARE

## 2022-09-27 VITALS
BODY MASS INDEX: 33.07 KG/M2 | SYSTOLIC BLOOD PRESSURE: 167 MMHG | DIASTOLIC BLOOD PRESSURE: 70 MMHG | HEART RATE: 60 BPM | HEIGHT: 70 IN | WEIGHT: 231 LBS

## 2022-09-27 DIAGNOSIS — Z47.1 AFTERCARE FOLLOWING JOINT REPLACEMENT SURGERY, UNSPECIFIED JOINT: Primary | ICD-10-CM

## 2022-09-27 PROCEDURE — 1159F MED LIST DOCD IN RCRD: CPT | Mod: CPTII,,, | Performed by: ORTHOPAEDIC SURGERY

## 2022-09-27 PROCEDURE — 1125F PR PAIN SEVERITY QUANTIFIED, PAIN PRESENT: ICD-10-PCS | Mod: CPTII,,, | Performed by: ORTHOPAEDIC SURGERY

## 2022-09-27 PROCEDURE — 1159F PR MEDICATION LIST DOCUMENTED IN MEDICAL RECORD: ICD-10-PCS | Mod: CPTII,,, | Performed by: ORTHOPAEDIC SURGERY

## 2022-09-27 PROCEDURE — 3078F DIAST BP <80 MM HG: CPT | Mod: CPTII,,, | Performed by: ORTHOPAEDIC SURGERY

## 2022-09-27 PROCEDURE — 3288F PR FALLS RISK ASSESSMENT DOCUMENTED: ICD-10-PCS | Mod: CPTII,,, | Performed by: ORTHOPAEDIC SURGERY

## 2022-09-27 PROCEDURE — 99024 POSTOP FOLLOW-UP VISIT: CPT | Mod: ,,, | Performed by: ORTHOPAEDIC SURGERY

## 2022-09-27 PROCEDURE — 3288F FALL RISK ASSESSMENT DOCD: CPT | Mod: CPTII,,, | Performed by: ORTHOPAEDIC SURGERY

## 2022-09-27 PROCEDURE — 99024 PR POST-OP FOLLOW-UP VISIT: ICD-10-PCS | Mod: ,,, | Performed by: ORTHOPAEDIC SURGERY

## 2022-09-27 PROCEDURE — 3077F PR MOST RECENT SYSTOLIC BLOOD PRESSURE >= 140 MM HG: ICD-10-PCS | Mod: CPTII,,, | Performed by: ORTHOPAEDIC SURGERY

## 2022-09-27 PROCEDURE — 4010F PR ACE/ARB THEARPY RXD/TAKEN: ICD-10-PCS | Mod: CPTII,,, | Performed by: ORTHOPAEDIC SURGERY

## 2022-09-27 PROCEDURE — 1101F PT FALLS ASSESS-DOCD LE1/YR: CPT | Mod: CPTII,,, | Performed by: ORTHOPAEDIC SURGERY

## 2022-09-27 PROCEDURE — 1125F AMNT PAIN NOTED PAIN PRSNT: CPT | Mod: CPTII,,, | Performed by: ORTHOPAEDIC SURGERY

## 2022-09-27 PROCEDURE — 3008F BODY MASS INDEX DOCD: CPT | Mod: CPTII,,, | Performed by: ORTHOPAEDIC SURGERY

## 2022-09-27 PROCEDURE — 3077F SYST BP >= 140 MM HG: CPT | Mod: CPTII,,, | Performed by: ORTHOPAEDIC SURGERY

## 2022-09-27 PROCEDURE — 4010F ACE/ARB THERAPY RXD/TAKEN: CPT | Mod: CPTII,,, | Performed by: ORTHOPAEDIC SURGERY

## 2022-09-27 PROCEDURE — 3078F PR MOST RECENT DIASTOLIC BLOOD PRESSURE < 80 MM HG: ICD-10-PCS | Mod: CPTII,,, | Performed by: ORTHOPAEDIC SURGERY

## 2022-09-27 PROCEDURE — 1101F PR PT FALLS ASSESS DOC 0-1 FALLS W/OUT INJ PAST YR: ICD-10-PCS | Mod: CPTII,,, | Performed by: ORTHOPAEDIC SURGERY

## 2022-09-27 PROCEDURE — 3008F PR BODY MASS INDEX (BMI) DOCUMENTED: ICD-10-PCS | Mod: CPTII,,, | Performed by: ORTHOPAEDIC SURGERY

## 2022-09-27 RX ORDER — HYDROCODONE BITARTRATE AND ACETAMINOPHEN 5; 325 MG/1; MG/1
1 TABLET ORAL EVERY 6 HOURS PRN
Qty: 28 TABLET | Refills: 0 | Status: SHIPPED | OUTPATIENT
Start: 2022-09-27 | End: 2022-10-06 | Stop reason: SDUPTHER

## 2022-09-27 RX ORDER — HYDROCODONE BITARTRATE AND ACETAMINOPHEN 7.5; 325 MG/1; MG/1
1 TABLET ORAL EVERY 6 HOURS PRN
Qty: 28 TABLET | Refills: 0 | Status: SHIPPED | OUTPATIENT
Start: 2022-09-29 | End: 2022-10-06

## 2022-09-27 NOTE — PROGRESS NOTES
Past Medical History:   Diagnosis Date    Diabetes mellitus, type 2     High cholesterol     Hypertension     Osteoarthritis     PAD (peripheral artery disease)        Past Surgical History:   Procedure Laterality Date    appendix removal      BACK SURGERY      peripheral stents Left     ROBOTIC ARTHROPLASTY, KNEE Right 8/8/2022    Procedure: ROBOTIC ARTHROPLASTY, KNEE, TOTAL;  Surgeon: Derek Whalen MD;  Location: [facility];  Service: Orthopedics;  Laterality: Right;       Current Outpatient Medications   Medication Sig    amLODIPine (NORVASC) 10 MG tablet Take 10 mg by mouth once daily.    atorvastatin (LIPITOR) 40 MG tablet TAKE 1 TABLET BY MOUTH 1 TIME IN THE EVENING    carvediloL (COREG) 6.25 MG tablet Take 6.25 mg by mouth 2 (two) times daily.    cetirizine (ZYRTEC) 10 MG tablet Take 10 mg by mouth once daily.    fluticasone propionate (FLONASE) 50 mcg/actuation nasal spray 1 spray by Each Nostril route once daily.    glimepiride (AMARYL) 4 MG tablet Take 4 mg by mouth 2 (two) times daily.    HYDROcodone-acetaminophen (NORCO) 5-325 mg per tablet Take 1 tablet by mouth every 6 (six) hours as needed for Pain.    HYDROcodone-acetaminophen (NORCO) 7.5-325 mg per tablet Take 1 tablet by mouth every 6 (six) hours as needed for Pain.    [START ON 9/29/2022] HYDROcodone-acetaminophen (NORCO) 7.5-325 mg per tablet Take 1 tablet by mouth every 6 (six) hours as needed for Pain.    lansoprazole (PREVACID) 15 MG capsule Take 15 mg by mouth once daily.    metFORMIN (GLUCOPHAGE) 1000 MG tablet Take 1 tablet (1,000 mg total) by mouth 2 (two) times daily with meals.    metFORMIN (GLUCOPHAGE) 500 MG tablet Take 2 tablets (1,000 mg total) by mouth 2 (two) times daily with meals.    valsartan (DIOVAN) 160 MG tablet Take 160 mg by mouth once daily.     No current facility-administered medications for this visit.       Review of patient's allergies indicates:  No Known Allergies    Family History   Family history unknown: Yes        Social History     Socioeconomic History    Marital status:    Tobacco Use    Smoking status: Former     Types: Cigarettes     Quit date:      Years since quittin.7    Smokeless tobacco: Never   Substance and Sexual Activity    Alcohol use: Not Currently    Drug use: Never       Chief Complaint:   Chief Complaint   Patient presents with    Right Knee - Follow-up     Follow up for right total knee sx on 22. Last follow up was 22. Knee has been good but still in a lot of pain. Still some swelling and numbness.         History of present illness:  72-year-old male status post total knee arthroplasty on the right side.  Patient is here today for follow-up.  His knee is doing well.  He has only been to roughly 6 therapy visits thus far.  Quit going to therapy because of some pain.      Review of Systems:    Constitution: Negative for chills, fever, and sweats.  Negative for unexplained weight loss.    HENT:  Negative for headaches and blurry vision.    Cardiovascular:Negative for chest pain or irregular heart beat. Negative for hypertension.    Respiratory:  Negative for cough and shortness of breath.    Gastrointestinal: Negative for abdominal pain, heartburn, melena, nausea, and vomitting.    Genitourinary:  Negative bladder incontinence and dysuria.    Musculoskeletal:  See HPI    Neurological: Negative for numbness.    Psychiatric/Behavioral: Negative for depression.  The patient is not nervous/anxious.      Endocrine: Negative for polyuria    Hematologic/Lymphatic: Negative for bleeding problem.  Does not bruise/bleed easily.    Skin: Negative for poor would healing and rash      Physical Examination:    Vital Signs:    Vitals:    22 0930   BP: (!) 167/70   Pulse: 60       Body mass index is 33.15 kg/m².    General: No acute distress, alert and oriented, healthy appearing    HEENT: Head is atraumatic, mucous membranes are moist    Neck: Supples, no JVD    Cardiovascular: Palpable  dorsalis pedis and posterior tibial pulses, regular rate and rhythm to those pulses    Lungs: Breathing non-labored    Skin: no rashes appreciated    Neurologic: Can flex and extend knees, ankles, and toes. Sensation is grossly intact    Right knee:  Patient's knee is stable.  He is somewhat stiff.  Range of motion from 0-95.    X-rays:  Three views of the right knee reviewed.  Patient's implants well fixed.  No signs of loosening or subsidence noted     Assessment::  Status post right total knee arthroplasty    Plan:  Overall patient doing well from a pain standpoint however he is somewhat stiff due to lack of therapy.  All given therapy aggressively for the next couple weeks.  We will refill his pain medication today.  At the recheck him in a couple weeks, he has not regained motion at that point in time plan to do manipulation under anesthesia.    This note was created using [employer] voice recognition software that occasionally misinterpreted phrases or words.    Consult note is delivered via Epic messaging service.

## 2022-10-06 RX ORDER — HYDROCODONE BITARTRATE AND ACETAMINOPHEN 5; 325 MG/1; MG/1
1 TABLET ORAL EVERY 6 HOURS PRN
Qty: 28 TABLET | Refills: 0 | Status: SHIPPED | OUTPATIENT
Start: 2022-10-06 | End: 2022-10-13

## 2022-10-13 ENCOUNTER — OFFICE VISIT (OUTPATIENT)
Dept: ORTHOPEDICS | Facility: CLINIC | Age: 72
End: 2022-10-13
Payer: MEDICARE

## 2022-10-13 ENCOUNTER — HOSPITAL ENCOUNTER (OUTPATIENT)
Dept: RADIOLOGY | Facility: CLINIC | Age: 72
Discharge: HOME | End: 2022-10-13
Attending: NURSE PRACTITIONER
Payer: MEDICARE

## 2022-10-13 VITALS
SYSTOLIC BLOOD PRESSURE: 130 MMHG | HEIGHT: 70 IN | HEART RATE: 65 BPM | WEIGHT: 231 LBS | DIASTOLIC BLOOD PRESSURE: 73 MMHG | BODY MASS INDEX: 33.07 KG/M2

## 2022-10-13 DIAGNOSIS — Z47.1 AFTERCARE FOLLOWING JOINT REPLACEMENT SURGERY, UNSPECIFIED JOINT: ICD-10-CM

## 2022-10-13 DIAGNOSIS — Z96.651 AFTERCARE FOLLOWING RIGHT KNEE JOINT REPLACEMENT SURGERY: Primary | ICD-10-CM

## 2022-10-13 DIAGNOSIS — Z47.1 AFTERCARE FOLLOWING RIGHT KNEE JOINT REPLACEMENT SURGERY: Primary | ICD-10-CM

## 2022-10-13 PROCEDURE — 3288F PR FALLS RISK ASSESSMENT DOCUMENTED: ICD-10-PCS | Mod: CPTII,,, | Performed by: ORTHOPAEDIC SURGERY

## 2022-10-13 PROCEDURE — 1101F PR PT FALLS ASSESS DOC 0-1 FALLS W/OUT INJ PAST YR: ICD-10-PCS | Mod: CPTII,,, | Performed by: ORTHOPAEDIC SURGERY

## 2022-10-13 PROCEDURE — 1125F PR PAIN SEVERITY QUANTIFIED, PAIN PRESENT: ICD-10-PCS | Mod: CPTII,,, | Performed by: ORTHOPAEDIC SURGERY

## 2022-10-13 PROCEDURE — 3078F PR MOST RECENT DIASTOLIC BLOOD PRESSURE < 80 MM HG: ICD-10-PCS | Mod: CPTII,,, | Performed by: ORTHOPAEDIC SURGERY

## 2022-10-13 PROCEDURE — 1160F RVW MEDS BY RX/DR IN RCRD: CPT | Mod: CPTII,,, | Performed by: ORTHOPAEDIC SURGERY

## 2022-10-13 PROCEDURE — 1101F PT FALLS ASSESS-DOCD LE1/YR: CPT | Mod: CPTII,,, | Performed by: ORTHOPAEDIC SURGERY

## 2022-10-13 PROCEDURE — 3078F DIAST BP <80 MM HG: CPT | Mod: CPTII,,, | Performed by: ORTHOPAEDIC SURGERY

## 2022-10-13 PROCEDURE — 4010F ACE/ARB THERAPY RXD/TAKEN: CPT | Mod: CPTII,,, | Performed by: ORTHOPAEDIC SURGERY

## 2022-10-13 PROCEDURE — 99024 POSTOP FOLLOW-UP VISIT: CPT | Mod: ,,, | Performed by: ORTHOPAEDIC SURGERY

## 2022-10-13 PROCEDURE — 1125F AMNT PAIN NOTED PAIN PRSNT: CPT | Mod: CPTII,,, | Performed by: ORTHOPAEDIC SURGERY

## 2022-10-13 PROCEDURE — 3075F SYST BP GE 130 - 139MM HG: CPT | Mod: CPTII,,, | Performed by: ORTHOPAEDIC SURGERY

## 2022-10-13 PROCEDURE — 1159F MED LIST DOCD IN RCRD: CPT | Mod: CPTII,,, | Performed by: ORTHOPAEDIC SURGERY

## 2022-10-13 PROCEDURE — 4010F PR ACE/ARB THEARPY RXD/TAKEN: ICD-10-PCS | Mod: CPTII,,, | Performed by: ORTHOPAEDIC SURGERY

## 2022-10-13 PROCEDURE — 73562 XR KNEE 3 VIEW RIGHT: ICD-10-PCS | Mod: RT,,, | Performed by: NURSE PRACTITIONER

## 2022-10-13 PROCEDURE — 1160F PR REVIEW ALL MEDS BY PRESCRIBER/CLIN PHARMACIST DOCUMENTED: ICD-10-PCS | Mod: CPTII,,, | Performed by: ORTHOPAEDIC SURGERY

## 2022-10-13 PROCEDURE — 3288F FALL RISK ASSESSMENT DOCD: CPT | Mod: CPTII,,, | Performed by: ORTHOPAEDIC SURGERY

## 2022-10-13 PROCEDURE — 73562 X-RAY EXAM OF KNEE 3: CPT | Mod: RT,,, | Performed by: NURSE PRACTITIONER

## 2022-10-13 PROCEDURE — 3075F PR MOST RECENT SYSTOLIC BLOOD PRESS GE 130-139MM HG: ICD-10-PCS | Mod: CPTII,,, | Performed by: ORTHOPAEDIC SURGERY

## 2022-10-13 PROCEDURE — 99024 PR POST-OP FOLLOW-UP VISIT: ICD-10-PCS | Mod: ,,, | Performed by: ORTHOPAEDIC SURGERY

## 2022-10-13 PROCEDURE — 1159F PR MEDICATION LIST DOCUMENTED IN MEDICAL RECORD: ICD-10-PCS | Mod: CPTII,,, | Performed by: ORTHOPAEDIC SURGERY

## 2022-10-13 NOTE — PROGRESS NOTES
Past Medical History:   Diagnosis Date    Diabetes mellitus, type 2     High cholesterol     Hypertension     Osteoarthritis     PAD (peripheral artery disease)        Past Surgical History:   Procedure Laterality Date    appendix removal      BACK SURGERY      peripheral stents Left     ROBOTIC ARTHROPLASTY, KNEE Right 2022    Procedure: ROBOTIC ARTHROPLASTY, KNEE, TOTAL;  Surgeon: Derek Whalen MD;  Location: [facility];  Service: Orthopedics;  Laterality: Right;       Current Outpatient Medications   Medication Sig    amLODIPine (NORVASC) 10 MG tablet Take 10 mg by mouth once daily.    atorvastatin (LIPITOR) 40 MG tablet TAKE 1 TABLET BY MOUTH 1 TIME IN THE EVENING    carvediloL (COREG) 6.25 MG tablet Take 6.25 mg by mouth 2 (two) times daily.    cetirizine (ZYRTEC) 10 MG tablet Take 10 mg by mouth once daily.    fluticasone propionate (FLONASE) 50 mcg/actuation nasal spray 1 spray by Each Nostril route once daily.    glimepiride (AMARYL) 4 MG tablet Take 4 mg by mouth 2 (two) times daily.    HYDROcodone-acetaminophen (NORCO) 5-325 mg per tablet Take 1 tablet by mouth every 6 (six) hours as needed for Pain.    lansoprazole (PREVACID) 15 MG capsule Take 15 mg by mouth once daily.    metFORMIN (GLUCOPHAGE) 1000 MG tablet Take 1 tablet (1,000 mg total) by mouth 2 (two) times daily with meals.    metFORMIN (GLUCOPHAGE) 500 MG tablet Take 2 tablets (1,000 mg total) by mouth 2 (two) times daily with meals.    valsartan (DIOVAN) 160 MG tablet Take 160 mg by mouth once daily.     No current facility-administered medications for this visit.       Review of patient's allergies indicates:  No Known Allergies    Family History   Family history unknown: Yes       Social History     Socioeconomic History    Marital status:    Tobacco Use    Smoking status: Former     Types: Cigarettes     Quit date:      Years since quittin.8    Smokeless tobacco: Never   Substance and Sexual Activity    Alcohol use: Not  Currently    Drug use: Never       Chief Complaint:   Chief Complaint   Patient presents with    Right Knee - Follow-up     Follow-up for right knee sx on 8/8/22. Knee has been doing good. PT is going good. ROM is real good.. Still having pain.        History of present illness: Moises San is a 72 y.o. male, presents to clinic today for a range-of-motion check status post right total knee arthroplasty.  Patient has begun physical therapy in which she switched places.  States they have gotten his flexion up to 180°.  Ambulating without any assistance.  Denies any significant pain or discomfort in the its time.      Review of Systems:    Denies fevers, chills, chest pain, shortness of breath. Comprehensive review of systems performed and otherwise negative except as noted in HPI      Physical Examination:    General: awake and alert, no acute distress, healthy appearing  Head and Neck: Head atraumatic/normocephalic. Moist MM  CV: brisk cap refill  Lungs: non-labored breathing, w/o cough or SOB  Skin: no rashes present, warm to touch  Neuro: sensation grossly intact distall     Vital Signs:    Vitals:    10/13/22 1027   BP: 130/73   Pulse: 65       Body mass index is 33.15 kg/m².    Examination right knee:    Incision clean dry and intact. No erythema or drainage or signs of infection.  Sensation intact distally to right foot  Positive FHL/EHL/gastrocsoleus/tib ant  Brisk capillary refill to right foot  No swelling or signs of DVT  Range of motion: extension at 5 and flexion at 110  Stable to varus valgus  Stable to anterior and posterior drawer    X-rays: 3 views of the right knee reviewed. Patient's implants appear well fixed. No signs of loosening or subsidence noted.     Assessment::post op status post R TKA    Plan:  Patient presents to clinic today for a range-of-motion check.  Patient's flexion is to about 110° in clinic.  At this point is no need for manipulation.  Patient was educated about this and  states understanding.  However follow-up in clinic in the next 2 months to reassess his knee.  Patient understands and agrees with plan care.    The above findings, diagnostics, and treatment plan were discussed with Dr. Derek Whalen who is in agreement with the plan of care.      This note was created using [employer] voice recognition software that occasionally misinterpreted phrases or words.    Consult note is delivered via Epic messaging service.

## 2022-10-28 DIAGNOSIS — Z47.1 AFTERCARE FOLLOWING RIGHT KNEE JOINT REPLACEMENT SURGERY: Primary | ICD-10-CM

## 2022-10-28 DIAGNOSIS — Z96.651 AFTERCARE FOLLOWING RIGHT KNEE JOINT REPLACEMENT SURGERY: Primary | ICD-10-CM

## 2022-10-28 RX ORDER — TRAMADOL HYDROCHLORIDE 50 MG/1
50 TABLET ORAL EVERY 6 HOURS PRN
Qty: 28 TABLET | Refills: 0 | Status: SHIPPED | OUTPATIENT
Start: 2022-10-28 | End: 2022-11-04

## 2022-12-15 ENCOUNTER — OFFICE VISIT (OUTPATIENT)
Dept: ORTHOPEDICS | Facility: CLINIC | Age: 72
End: 2022-12-15
Payer: MEDICARE

## 2022-12-15 VITALS
HEART RATE: 61 BPM | BODY MASS INDEX: 31.5 KG/M2 | HEIGHT: 70 IN | WEIGHT: 220 LBS | DIASTOLIC BLOOD PRESSURE: 69 MMHG | SYSTOLIC BLOOD PRESSURE: 132 MMHG

## 2022-12-15 DIAGNOSIS — Z96.651 AFTERCARE FOLLOWING RIGHT KNEE JOINT REPLACEMENT SURGERY: Primary | ICD-10-CM

## 2022-12-15 DIAGNOSIS — Z47.1 AFTERCARE FOLLOWING RIGHT KNEE JOINT REPLACEMENT SURGERY: Primary | ICD-10-CM

## 2022-12-15 PROCEDURE — 3078F PR MOST RECENT DIASTOLIC BLOOD PRESSURE < 80 MM HG: ICD-10-PCS | Mod: CPTII,,, | Performed by: ORTHOPAEDIC SURGERY

## 2022-12-15 PROCEDURE — 3075F PR MOST RECENT SYSTOLIC BLOOD PRESS GE 130-139MM HG: ICD-10-PCS | Mod: CPTII,,, | Performed by: ORTHOPAEDIC SURGERY

## 2022-12-15 PROCEDURE — 1101F PT FALLS ASSESS-DOCD LE1/YR: CPT | Mod: CPTII,,, | Performed by: ORTHOPAEDIC SURGERY

## 2022-12-15 PROCEDURE — 1159F PR MEDICATION LIST DOCUMENTED IN MEDICAL RECORD: ICD-10-PCS | Mod: CPTII,,, | Performed by: ORTHOPAEDIC SURGERY

## 2022-12-15 PROCEDURE — 4010F PR ACE/ARB THEARPY RXD/TAKEN: ICD-10-PCS | Mod: CPTII,,, | Performed by: ORTHOPAEDIC SURGERY

## 2022-12-15 PROCEDURE — 1159F MED LIST DOCD IN RCRD: CPT | Mod: CPTII,,, | Performed by: ORTHOPAEDIC SURGERY

## 2022-12-15 PROCEDURE — 3008F BODY MASS INDEX DOCD: CPT | Mod: CPTII,,, | Performed by: ORTHOPAEDIC SURGERY

## 2022-12-15 PROCEDURE — 3075F SYST BP GE 130 - 139MM HG: CPT | Mod: CPTII,,, | Performed by: ORTHOPAEDIC SURGERY

## 2022-12-15 PROCEDURE — 1125F PR PAIN SEVERITY QUANTIFIED, PAIN PRESENT: ICD-10-PCS | Mod: CPTII,,, | Performed by: ORTHOPAEDIC SURGERY

## 2022-12-15 PROCEDURE — 3078F DIAST BP <80 MM HG: CPT | Mod: CPTII,,, | Performed by: ORTHOPAEDIC SURGERY

## 2022-12-15 PROCEDURE — 4010F ACE/ARB THERAPY RXD/TAKEN: CPT | Mod: CPTII,,, | Performed by: ORTHOPAEDIC SURGERY

## 2022-12-15 PROCEDURE — 3008F PR BODY MASS INDEX (BMI) DOCUMENTED: ICD-10-PCS | Mod: CPTII,,, | Performed by: ORTHOPAEDIC SURGERY

## 2022-12-15 PROCEDURE — 99213 PR OFFICE/OUTPT VISIT, EST, LEVL III, 20-29 MIN: ICD-10-PCS | Mod: ,,, | Performed by: ORTHOPAEDIC SURGERY

## 2022-12-15 PROCEDURE — 3288F PR FALLS RISK ASSESSMENT DOCUMENTED: ICD-10-PCS | Mod: CPTII,,, | Performed by: ORTHOPAEDIC SURGERY

## 2022-12-15 PROCEDURE — 99213 OFFICE O/P EST LOW 20 MIN: CPT | Mod: ,,, | Performed by: ORTHOPAEDIC SURGERY

## 2022-12-15 PROCEDURE — 3288F FALL RISK ASSESSMENT DOCD: CPT | Mod: CPTII,,, | Performed by: ORTHOPAEDIC SURGERY

## 2022-12-15 PROCEDURE — 1101F PR PT FALLS ASSESS DOC 0-1 FALLS W/OUT INJ PAST YR: ICD-10-PCS | Mod: CPTII,,, | Performed by: ORTHOPAEDIC SURGERY

## 2022-12-15 PROCEDURE — 1125F AMNT PAIN NOTED PAIN PRSNT: CPT | Mod: CPTII,,, | Performed by: ORTHOPAEDIC SURGERY

## 2022-12-15 NOTE — PROGRESS NOTES
Past Medical History:   Diagnosis Date    Diabetes mellitus, type 2     High cholesterol     Hypertension     Osteoarthritis     PAD (peripheral artery disease)        Past Surgical History:   Procedure Laterality Date    appendix removal      BACK SURGERY      peripheral stents Left     ROBOTIC ARTHROPLASTY, KNEE Right 2022    Procedure: ROBOTIC ARTHROPLASTY, KNEE, TOTAL;  Surgeon: Derek Whalen MD;  Location: [facility];  Service: Orthopedics;  Laterality: Right;       Current Outpatient Medications   Medication Sig    amLODIPine (NORVASC) 10 MG tablet Take 10 mg by mouth once daily.    atorvastatin (LIPITOR) 40 MG tablet TAKE 1 TABLET BY MOUTH 1 TIME IN THE EVENING    carvediloL (COREG) 6.25 MG tablet Take 6.25 mg by mouth 2 (two) times daily.    cetirizine (ZYRTEC) 10 MG tablet Take 10 mg by mouth once daily.    fluticasone propionate (FLONASE) 50 mcg/actuation nasal spray 1 spray by Each Nostril route once daily.    glimepiride (AMARYL) 4 MG tablet Take 4 mg by mouth 2 (two) times daily.    lansoprazole (PREVACID) 15 MG capsule Take 15 mg by mouth once daily.    metFORMIN (GLUCOPHAGE) 1000 MG tablet Take 1 tablet (1,000 mg total) by mouth 2 (two) times daily with meals.    metFORMIN (GLUCOPHAGE) 500 MG tablet Take 2 tablets (1,000 mg total) by mouth 2 (two) times daily with meals.    valsartan (DIOVAN) 160 MG tablet Take 160 mg by mouth once daily.     No current facility-administered medications for this visit.       Review of patient's allergies indicates:  No Known Allergies    Family History   Problem Relation Age of Onset    Diabetes Mother        Social History     Socioeconomic History    Marital status:    Tobacco Use    Smoking status: Former     Types: Cigarettes     Quit date:      Years since quittin.9    Smokeless tobacco: Never   Substance and Sexual Activity    Alcohol use: Not Currently    Drug use: Never    Sexual activity: Not Currently     Comment:        Chief  Complaint:   Chief Complaint   Patient presents with    Follow-up     Pt is present for his follow-up and states he is doing fine. Reports a constant mild pain that feels like an ache and sometimes sharp even at rest. Pain scale 2/10.       History of present illness:  72-year-old male presents today status post arthroplasty on the right side.  Patient is about 3 months out.  Overall he is doing great.  He says his knee is much improved from his preoperative levels.  He is very satisfied with his recovery thus far.  Does have very mild intermittent pain on occasion but much improved from preoperative levels.      Review of Systems:    Constitution: Negative for chills, fever, and sweats.  Negative for unexplained weight loss.    HENT:  Negative for headaches and blurry vision.    Cardiovascular:Negative for chest pain or irregular heart beat. Negative for hypertension.    Respiratory:  Negative for cough and shortness of breath.    Gastrointestinal: Negative for abdominal pain, heartburn, melena, nausea, and vomitting.    Genitourinary:  Negative bladder incontinence and dysuria.    Musculoskeletal:  See HPI    Neurological: Negative for numbness.    Psychiatric/Behavioral: Negative for depression.  The patient is not nervous/anxious.      Endocrine: Negative for polyuria    Hematologic/Lymphatic: Negative for bleeding problem.  Does not bruise/bleed easily.    Skin: Negative for poor would healing and rash      Physical Examination:    Vital Signs:    Vitals:    12/15/22 1029   BP: 132/69   Pulse: 61       Body mass index is 31.57 kg/m².    General: No acute distress, alert and oriented, healthy appearing    HEENT: Head is atraumatic, mucous membranes are moist    Neck: Supples, no JVD    Cardiovascular: Palpable dorsalis pedis and posterior tibial pulses, regular rate and rhythm to those pulses    Lungs: Breathing non-labored    Skin: no rashes appreciated    Neurologic: Can flex and extend knees, ankles, and  toes. Sensation is grossly intact    Right knee:  Full extension.  Flexion greater than 120°.  Stable to varus and valgus.  Stable to anterior-posterior drawer.    X-rays:      Assessment::  Status post right total knee arthroplasty    Plan:  Overall patient doing well.  He is status post recovery.  We will see him back in 9 months.  We discussed further treatment options were antibiotics before any dental work.  He will contact us if he needs any intervention with regard to his left knee.    This note was created using [employer] voice recognition software that occasionally misinterpreted phrases or words.    Consult note is delivered via Epic messaging service.

## (undated) DEVICE — BLADE MAKO STANDARD

## (undated) DEVICE — KIT SURGICAL TURNOVER

## (undated) DEVICE — BANDAGE ACE ELASTIC 6"

## (undated) DEVICE — GLOVE PROTEXIS HYDROGEL SZ8.5

## (undated) DEVICE — KIT TRIATHLON CR TIB PREP SZ5

## (undated) DEVICE — GAUZE SPONGE 4X4 12PLY

## (undated) DEVICE — Device

## (undated) DEVICE — KIT TRIATHLON CR FEM PREP SZ4

## (undated) DEVICE — DRESSING XEROFORM FOIL PK 1X8

## (undated) DEVICE — APPLICATOR CHLORAPREP ORN 26ML

## (undated) DEVICE — CORD SILICONE RETRACTOR

## (undated) DEVICE — GOWN POLY REINF X-LONG 2XL

## (undated) DEVICE — DRESSING GAUZE XEROFORM 5X9

## (undated) DEVICE — WRAP DEMAYO LEG STERILE

## (undated) DEVICE — GLOVE PROTEXIS BLUE LATEX 8.5

## (undated) DEVICE — DEVICE STRATAFIX SYMMETRIC +

## (undated) DEVICE — SOL NACL IRR 1000ML BTL

## (undated) DEVICE — PAD CAST SPECIALIST STRL 6

## (undated) DEVICE — BLADE SAG DUAL CUT 18X90X1.35

## (undated) DEVICE — CUFF ATS 2 PORT SNGL BLDR 34IN

## (undated) DEVICE — STAPLER SKIN PROXIMATE WIDE

## (undated) DEVICE — KIT CHECKPOINT MAKO

## (undated) DEVICE — DRAPE MEDIUM SHEET 40X70IN

## (undated) DEVICE — SOL NACL IRR 3000ML

## (undated) DEVICE — DRAPE FULL SHEET 70X100IN

## (undated) DEVICE — GLOVE PROTEXIS HYDROGEL SZ6.5

## (undated) DEVICE — SUT MONO 2-0 CT-1 VIL

## (undated) DEVICE — CUSHION  WC FOAM 20X20X.75IN

## (undated) DEVICE — TAPE SILK 3IN

## (undated) DEVICE — KIT DRAPE RIO ONE PIECE W/POCK

## (undated) DEVICE — SUT MONOCRYL 3-0 PS-2 UND

## (undated) DEVICE — ELECTRODE PATIENT RETURN DISP

## (undated) DEVICE — PIN BONE 3.2X110MM
Type: IMPLANTABLE DEVICE | Site: KNEE | Status: NON-FUNCTIONAL
Removed: 2022-08-08

## (undated) DEVICE — COVER TABLE HVY DTY 60X90IN

## (undated) DEVICE — KIT VIZADISC KNEE TRACKING

## (undated) DEVICE — DRAPE STERI U-SHAPED 47X51IN

## (undated) DEVICE — PAD ABD 8X10 STERILE

## (undated) DEVICE — GLOVE 7.0 PROTEXIS PI BLUE

## (undated) DEVICE — SUT VICRYL BR 1 GEN 27 CT-1